# Patient Record
Sex: MALE | Race: WHITE | Employment: OTHER | URBAN - NONMETROPOLITAN AREA
[De-identification: names, ages, dates, MRNs, and addresses within clinical notes are randomized per-mention and may not be internally consistent; named-entity substitution may affect disease eponyms.]

---

## 2017-02-08 ENCOUNTER — OFFICE VISIT (OUTPATIENT)
Dept: PRIMARY CARE CLINIC | Age: 42
End: 2017-02-08
Payer: MEDICAID

## 2017-02-08 VITALS
OXYGEN SATURATION: 96 % | TEMPERATURE: 98 F | WEIGHT: 228 LBS | HEIGHT: 70 IN | HEART RATE: 88 BPM | SYSTOLIC BLOOD PRESSURE: 146 MMHG | BODY MASS INDEX: 32.64 KG/M2 | RESPIRATION RATE: 20 BRPM | DIASTOLIC BLOOD PRESSURE: 88 MMHG

## 2017-02-08 DIAGNOSIS — R73.9 HYPERGLYCEMIA: ICD-10-CM

## 2017-02-08 DIAGNOSIS — M54.16 LUMBAR RADICULOPATHY: ICD-10-CM

## 2017-02-08 DIAGNOSIS — I10 ESSENTIAL HYPERTENSION: ICD-10-CM

## 2017-02-08 DIAGNOSIS — M51.36 DISC DEGENERATION, LUMBAR: Primary | ICD-10-CM

## 2017-02-08 DIAGNOSIS — F90.2 ATTENTION DEFICIT HYPERACTIVITY DISORDER (ADHD), COMBINED TYPE: ICD-10-CM

## 2017-02-08 LAB
AMPHETAMINE SCREEN, URINE: NORMAL
BARBITURATE SCREEN, URINE: NORMAL
BENZODIAZEPINE SCREEN, URINE: NORMAL
COCAINE METABOLITE SCREEN URINE: NORMAL
HBA1C MFR BLD: 5.4 %
MDMA URINE: NORMAL
METHADONE SCREEN, URINE: NORMAL
METHAMPHETAMINE, URINE: NORMAL
OPIATE SCREEN URINE: NORMAL
OXYCODONE SCREEN URINE: NORMAL
PHENCYCLIDINE SCREEN URINE: NORMAL
PROPOXYPHENE SCREEN, URINE: NORMAL
THC: NORMAL
TRICYCLIC ANTIDEPRESSANTS, UR: NORMAL

## 2017-02-08 PROCEDURE — 80305 DRUG TEST PRSMV DIR OPT OBS: CPT | Performed by: FAMILY MEDICINE

## 2017-02-08 PROCEDURE — 99214 OFFICE O/P EST MOD 30 MIN: CPT | Performed by: FAMILY MEDICINE

## 2017-02-08 PROCEDURE — 83036 HEMOGLOBIN GLYCOSYLATED A1C: CPT | Performed by: FAMILY MEDICINE

## 2017-02-08 RX ORDER — OXYCODONE AND ACETAMINOPHEN 10; 325 MG/1; MG/1
1 TABLET ORAL EVERY 8 HOURS PRN
Qty: 90 TABLET | Refills: 0 | Status: SHIPPED | OUTPATIENT
Start: 2017-02-08 | End: 2017-03-27 | Stop reason: SDUPTHER

## 2017-02-08 RX ORDER — DEXTROAMPHETAMINE SACCHARATE, AMPHETAMINE ASPARTATE MONOHYDRATE, DEXTROAMPHETAMINE SULFATE AND AMPHETAMINE SULFATE 7.5; 7.5; 7.5; 7.5 MG/1; MG/1; MG/1; MG/1
30 CAPSULE, EXTENDED RELEASE ORAL EVERY MORNING
Qty: 30 CAPSULE | Refills: 0 | Status: SHIPPED | OUTPATIENT
Start: 2017-02-08 | End: 2017-02-17 | Stop reason: ALTCHOICE

## 2017-02-08 RX ORDER — HYDROCODONE BITARTRATE AND ACETAMINOPHEN 10; 325 MG/1; MG/1
1 TABLET ORAL EVERY 8 HOURS PRN
Qty: 90 TABLET | Refills: 0 | Status: CANCELLED | OUTPATIENT
Start: 2017-02-08

## 2017-02-08 ASSESSMENT — ENCOUNTER SYMPTOMS
COUGH: 0
BACK PAIN: 1
NAUSEA: 0
TROUBLE SWALLOWING: 0
VOMITING: 0
CHEST TIGHTNESS: 0
RHINORRHEA: 0
SORE THROAT: 0
EYE PAIN: 0
COLOR CHANGE: 0
SHORTNESS OF BREATH: 0
ABDOMINAL PAIN: 0
WHEEZING: 0
CONSTIPATION: 0
DIARRHEA: 0
PHOTOPHOBIA: 0

## 2017-02-14 ENCOUNTER — TELEPHONE (OUTPATIENT)
Dept: PRIMARY CARE CLINIC | Age: 42
End: 2017-02-14

## 2017-02-16 RX ORDER — DEXTROAMPHETAMINE SACCHARATE, AMPHETAMINE ASPARTATE, DEXTROAMPHETAMINE SULFATE AND AMPHETAMINE SULFATE 7.5; 7.5; 7.5; 7.5 MG/1; MG/1; MG/1; MG/1
30 TABLET ORAL 2 TIMES DAILY
Qty: 60 TABLET | Refills: 0 | Status: CANCELLED | OUTPATIENT
Start: 2017-02-16

## 2017-02-17 RX ORDER — DEXTROAMPHETAMINE SACCHARATE, AMPHETAMINE ASPARTATE, DEXTROAMPHETAMINE SULFATE AND AMPHETAMINE SULFATE 5; 5; 5; 5 MG/1; MG/1; MG/1; MG/1
20 TABLET ORAL 2 TIMES DAILY
Qty: 60 TABLET | Refills: 0 | Status: SHIPPED | OUTPATIENT
Start: 2017-02-17 | End: 2017-03-23 | Stop reason: SDUPTHER

## 2017-03-23 DIAGNOSIS — M51.36 DISC DEGENERATION, LUMBAR: ICD-10-CM

## 2017-03-23 DIAGNOSIS — M54.16 LUMBAR RADICULOPATHY: ICD-10-CM

## 2017-03-23 RX ORDER — HYDROCODONE BITARTRATE AND ACETAMINOPHEN 10; 325 MG/1; MG/1
1 TABLET ORAL EVERY 8 HOURS PRN
Qty: 90 TABLET | Refills: 0 | Status: CANCELLED | OUTPATIENT
Start: 2017-03-23

## 2017-03-29 RX ORDER — OXYCODONE AND ACETAMINOPHEN 10; 325 MG/1; MG/1
TABLET ORAL
Qty: 90 TABLET | Refills: 0 | OUTPATIENT
Start: 2017-03-29

## 2017-03-29 RX ORDER — DEXTROAMPHETAMINE SACCHARATE, AMPHETAMINE ASPARTATE, DEXTROAMPHETAMINE SULFATE AND AMPHETAMINE SULFATE 5; 5; 5; 5 MG/1; MG/1; MG/1; MG/1
TABLET ORAL
Qty: 60 TABLET | Refills: 0 | OUTPATIENT
Start: 2017-03-29

## 2017-03-29 RX ORDER — OXYCODONE AND ACETAMINOPHEN 10; 325 MG/1; MG/1
1 TABLET ORAL EVERY 8 HOURS PRN
Qty: 90 TABLET | Refills: 0 | Status: SHIPPED | OUTPATIENT
Start: 2017-03-29 | End: 2017-04-28 | Stop reason: SDUPTHER

## 2017-03-29 RX ORDER — DEXTROAMPHETAMINE SACCHARATE, AMPHETAMINE ASPARTATE, DEXTROAMPHETAMINE SULFATE AND AMPHETAMINE SULFATE 5; 5; 5; 5 MG/1; MG/1; MG/1; MG/1
20 TABLET ORAL 2 TIMES DAILY
Qty: 60 TABLET | Refills: 0 | Status: SHIPPED | OUTPATIENT
Start: 2017-03-29 | End: 2017-04-28 | Stop reason: SDUPTHER

## 2017-05-01 RX ORDER — OXYCODONE AND ACETAMINOPHEN 10; 325 MG/1; MG/1
1 TABLET ORAL EVERY 8 HOURS PRN
Qty: 90 TABLET | Refills: 0 | Status: SHIPPED | OUTPATIENT
Start: 2017-05-01 | End: 2017-05-02 | Stop reason: SDUPTHER

## 2017-05-01 RX ORDER — DEXTROAMPHETAMINE SACCHARATE, AMPHETAMINE ASPARTATE, DEXTROAMPHETAMINE SULFATE AND AMPHETAMINE SULFATE 5; 5; 5; 5 MG/1; MG/1; MG/1; MG/1
20 TABLET ORAL 2 TIMES DAILY
Qty: 60 TABLET | Refills: 0 | Status: SHIPPED | OUTPATIENT
Start: 2017-05-01 | End: 2017-05-02 | Stop reason: SDUPTHER

## 2017-05-02 RX ORDER — DEXTROAMPHETAMINE SACCHARATE, AMPHETAMINE ASPARTATE, DEXTROAMPHETAMINE SULFATE AND AMPHETAMINE SULFATE 5; 5; 5; 5 MG/1; MG/1; MG/1; MG/1
20 TABLET ORAL 2 TIMES DAILY
Qty: 60 TABLET | Refills: 0 | Status: SHIPPED | OUTPATIENT
Start: 2017-05-02 | End: 2017-08-21 | Stop reason: SDUPTHER

## 2017-05-02 RX ORDER — OXYCODONE AND ACETAMINOPHEN 10; 325 MG/1; MG/1
1 TABLET ORAL EVERY 8 HOURS PRN
Qty: 90 TABLET | Refills: 0 | Status: SHIPPED | OUTPATIENT
Start: 2017-05-02 | End: 2017-06-01

## 2017-08-21 ENCOUNTER — OFFICE VISIT (OUTPATIENT)
Dept: PRIMARY CARE CLINIC | Age: 42
End: 2017-08-21
Payer: MEDICAID

## 2017-08-21 VITALS
TEMPERATURE: 97.8 F | WEIGHT: 233 LBS | HEIGHT: 70 IN | BODY MASS INDEX: 33.36 KG/M2 | HEART RATE: 96 BPM | OXYGEN SATURATION: 98 % | SYSTOLIC BLOOD PRESSURE: 138 MMHG | RESPIRATION RATE: 18 BRPM | DIASTOLIC BLOOD PRESSURE: 78 MMHG

## 2017-08-21 DIAGNOSIS — F90.2 ATTENTION DEFICIT HYPERACTIVITY DISORDER (ADHD), COMBINED TYPE: Primary | ICD-10-CM

## 2017-08-21 DIAGNOSIS — E66.09 NON MORBID OBESITY DUE TO EXCESS CALORIES: ICD-10-CM

## 2017-08-21 DIAGNOSIS — Z98.1 S/P LUMBAR FUSION: ICD-10-CM

## 2017-08-21 DIAGNOSIS — M51.36 DDD (DEGENERATIVE DISC DISEASE), LUMBAR: ICD-10-CM

## 2017-08-21 PROCEDURE — 99214 OFFICE O/P EST MOD 30 MIN: CPT | Performed by: FAMILY MEDICINE

## 2017-08-21 RX ORDER — OXYCODONE AND ACETAMINOPHEN 10; 325 MG/1; MG/1
1 TABLET ORAL 3 TIMES DAILY PRN
COMMUNITY
End: 2017-08-21 | Stop reason: SDUPTHER

## 2017-08-21 RX ORDER — DEXTROAMPHETAMINE SACCHARATE, AMPHETAMINE ASPARTATE, DEXTROAMPHETAMINE SULFATE AND AMPHETAMINE SULFATE 5; 5; 5; 5 MG/1; MG/1; MG/1; MG/1
20 TABLET ORAL 2 TIMES DAILY
Qty: 60 TABLET | Refills: 0 | Status: SHIPPED | OUTPATIENT
Start: 2017-08-21 | End: 2017-09-29 | Stop reason: SDUPTHER

## 2017-08-21 RX ORDER — OXYCODONE AND ACETAMINOPHEN 10; 325 MG/1; MG/1
1 TABLET ORAL 3 TIMES DAILY PRN
Qty: 90 TABLET | Refills: 0 | Status: SHIPPED | OUTPATIENT
Start: 2017-08-21 | End: 2017-09-29 | Stop reason: SDUPTHER

## 2017-08-21 ASSESSMENT — ENCOUNTER SYMPTOMS
PHOTOPHOBIA: 0
CONSTIPATION: 0
WHEEZING: 0
NAUSEA: 0
SHORTNESS OF BREATH: 0
SORE THROAT: 0
COUGH: 0
CHEST TIGHTNESS: 0
TROUBLE SWALLOWING: 0
EYE PAIN: 0
BACK PAIN: 1
RHINORRHEA: 0
ABDOMINAL PAIN: 0
DIARRHEA: 0
VOMITING: 0
COLOR CHANGE: 0

## 2017-09-29 RX ORDER — OXYCODONE AND ACETAMINOPHEN 10; 325 MG/1; MG/1
1 TABLET ORAL 3 TIMES DAILY PRN
Qty: 90 TABLET | Refills: 0 | Status: SHIPPED | OUTPATIENT
Start: 2017-09-29 | End: 2017-11-06 | Stop reason: SDUPTHER

## 2017-09-29 RX ORDER — DEXTROAMPHETAMINE SACCHARATE, AMPHETAMINE ASPARTATE, DEXTROAMPHETAMINE SULFATE AND AMPHETAMINE SULFATE 5; 5; 5; 5 MG/1; MG/1; MG/1; MG/1
20 TABLET ORAL 2 TIMES DAILY
Qty: 60 TABLET | Refills: 0 | Status: SHIPPED | OUTPATIENT
Start: 2017-09-29 | End: 2017-11-06 | Stop reason: SDUPTHER

## 2017-11-06 RX ORDER — DEXTROAMPHETAMINE SACCHARATE, AMPHETAMINE ASPARTATE, DEXTROAMPHETAMINE SULFATE AND AMPHETAMINE SULFATE 5; 5; 5; 5 MG/1; MG/1; MG/1; MG/1
20 TABLET ORAL 2 TIMES DAILY
Qty: 60 TABLET | Refills: 0 | Status: SHIPPED | OUTPATIENT
Start: 2017-11-06 | End: 2017-12-12 | Stop reason: SDUPTHER

## 2017-11-06 RX ORDER — OXYCODONE AND ACETAMINOPHEN 10; 325 MG/1; MG/1
1 TABLET ORAL 3 TIMES DAILY PRN
Qty: 90 TABLET | Refills: 0 | Status: SHIPPED | OUTPATIENT
Start: 2017-11-06 | End: 2017-12-12 | Stop reason: SDUPTHER

## 2017-11-18 ENCOUNTER — APPOINTMENT (OUTPATIENT)
Dept: CT IMAGING | Age: 42
End: 2017-11-18
Payer: MEDICAID

## 2017-11-18 ENCOUNTER — HOSPITAL ENCOUNTER (EMERGENCY)
Age: 42
Discharge: HOME | End: 2017-11-18
Attending: EMERGENCY MEDICINE
Payer: MEDICAID

## 2017-11-18 VITALS
RESPIRATION RATE: 18 BRPM | HEIGHT: 70 IN | TEMPERATURE: 98 F | WEIGHT: 230 LBS | HEART RATE: 80 BPM | DIASTOLIC BLOOD PRESSURE: 67 MMHG | BODY MASS INDEX: 32.93 KG/M2 | OXYGEN SATURATION: 98 % | SYSTOLIC BLOOD PRESSURE: 105 MMHG

## 2017-11-18 DIAGNOSIS — S39.012A STRAIN OF LUMBAR REGION, INITIAL ENCOUNTER: Primary | ICD-10-CM

## 2017-11-18 PROCEDURE — 99283 EMERGENCY DEPT VISIT LOW MDM: CPT

## 2017-11-18 PROCEDURE — 6360000002 HC RX W HCPCS: Performed by: EMERGENCY MEDICINE

## 2017-11-18 PROCEDURE — 72131 CT LUMBAR SPINE W/O DYE: CPT

## 2017-11-18 PROCEDURE — 99282 EMERGENCY DEPT VISIT SF MDM: CPT | Performed by: EMERGENCY MEDICINE

## 2017-11-18 PROCEDURE — 96372 THER/PROPH/DIAG INJ SC/IM: CPT

## 2017-11-18 RX ORDER — IBUPROFEN 800 MG/1
800 TABLET ORAL EVERY 6 HOURS PRN
Qty: 20 TABLET | Refills: 0 | Status: SHIPPED | OUTPATIENT
Start: 2017-11-18 | End: 2019-02-03 | Stop reason: ALTCHOICE

## 2017-11-18 RX ORDER — KETOROLAC TROMETHAMINE 30 MG/ML
60 INJECTION, SOLUTION INTRAMUSCULAR; INTRAVENOUS ONCE
Status: COMPLETED | OUTPATIENT
Start: 2017-11-18 | End: 2017-11-18

## 2017-11-18 RX ORDER — TRIAMCINOLONE ACETONIDE 40 MG/ML
40 INJECTION, SUSPENSION INTRA-ARTICULAR; INTRAMUSCULAR ONCE
Status: COMPLETED | OUTPATIENT
Start: 2017-11-18 | End: 2017-11-18

## 2017-11-18 RX ADMIN — KETOROLAC TROMETHAMINE 60 MG: 30 INJECTION, SOLUTION INTRAMUSCULAR at 15:47

## 2017-11-18 RX ADMIN — TRIAMCINOLONE ACETONIDE 40 MG: 40 INJECTION, SUSPENSION INTRA-ARTICULAR; INTRAMUSCULAR at 15:47

## 2017-11-18 ASSESSMENT — PAIN SCALES - GENERAL
PAINLEVEL_OUTOF10: 10

## 2017-11-18 ASSESSMENT — ENCOUNTER SYMPTOMS: BACK PAIN: 1

## 2017-11-18 NOTE — ED NOTES
Patient is resting with eyes closed and appears to not be in any distress. Lights are off in room. Family at bedside.      Angelita Catherine RN  11/18/17 7628

## 2017-11-18 NOTE — ED NOTES
While attempting to give discharge instructions to patient, patient fell asleep. Woke patient up twice and attempted to continue to give discharge instructions.       Trevor Renteria RN  11/18/17 5393

## 2017-11-18 NOTE — ED PROVIDER NOTES
[facility] EMERGENCY DEPT  eMERGENCY dEPARTMENT eNCOUnter      Pt Name: Adeola Lara  MRN: [id number]  [place] 1975  Date of evaluation: 11/18/2017  Provider: Otoniel Bravo MD    [address] [place] [address]       Chief Complaint   Patient presents with    Back Pain     Patient lifting box and twisted          HISTORY OF PRESENT ILLNESS   (Location/Symptom, Timing/Onset, Context/Setting, Quality, Duration, Modifying Factors, Severity)  Note limiting factors. Adeola Lara is a 43 y.o. male who presents to the emergency department        Back Pain   Location:  Lumbar spine  Quality:  Aching  Radiates to:  Does not radiate  Pain severity:  Severe  Pain is:  Unable to specify  Onset quality:  Sudden  Duration:  1 day  Timing:  Constant  Progression:  Unchanged  Chronicity:  Recurrent  Context comment:  \"twisted\", \"immediate excruciating pain\"  Relieved by:  None tried  Worsened by:  Twisting, touching and ambulation  Ineffective treatments:  Narcotics  Associated symptoms: no leg pain, no numbness, no paresthesias, no perianal numbness, no tingling and no weakness    Risk factors comment:  Lumbar surgeries x 2      Nursing Notes were reviewed. REVIEW OF SYSTEMS    (2-9 systems for level 4, 10 or more for level 5)     Review of Systems   Musculoskeletal: Positive for back pain. Neurological: Negative for tingling, weakness, numbness and paresthesias. All other systems reviewed and are negative. Except as noted above the remainder of the review of systems was reviewed and negative.        PAST MEDICAL HISTORY     Past Medical History:   Diagnosis Date    ADHD (attention deficit hyperactivity disorder)     Anxiety     Chronic back pain     Colon polyp     Disc degeneration, lumbar 5/8/2012    Herniated disc     L5-Dr Ted Berry    Hyperlipidemia     Obesity, unspecified 5/8/2012         SURGICAL HISTORY       Past Surgical History:   Procedure Laterality Date    AV FISTULA REPAIR  2003    COLON SURGERY  2003    polyp [place] [place] SPINE SURGERY  2002    L5-S1-Dr Peoples-lumbar laminectomy   [facility] SPINE SURGERY  09-    Dr Woodruff Masters Jese Campos with fusion, [facility]         CURRENT MEDICATIONS       Discharge Medication List as of 11/18/2017  5:10 PM      CONTINUE these medications which have NOT CHANGED    Details   amphetamine-dextroamphetamine (ADDERALL, 20MG,) 20 MG tablet Take 1 tablet by mouth 2 times daily . , Disp-60 tablet, R-0Normal      oxyCODONE-acetaminophen (PERCOCET)  MG per tablet Take 1 tablet by mouth 3 times daily as needed for Pain ., Disp-90 tablet, R-0Normal             ALLERGIES     Neurontin [gabapentin]    FAMILY HISTORY       Family History   Problem Relation Age of Onset    Prostate Cancer Paternal Uncle     Lung Cancer Paternal Grandfather     Brain Cancer Paternal Grandfather     Prostate Cancer Paternal Grandfather           SOCIAL HISTORY       Social History     Social History    Marital status:      Spouse name: N/A    Number of children: N/A    Years of education: N/A     Social History Main Topics    Smoking status: Current Every Day Smoker     Packs/day: 0.50     Years: 12.00    Smokeless tobacco: Never Used    Alcohol use No    Drug use: No    Sexual activity: Not Asked     Other Topics Concern    None     Social History Narrative    None       SCREENINGS    Anderson Coma Scale  Eye Opening: Spontaneous  Best Verbal Response: Oriented  Best Motor Response: Obeys commands  W[place] Coma Scale Score: 15        PHYSICAL EXAM    (up to 7 for level 4, 8 or more for level 5)     ED Triage Vitals [11/18/17 1424]   BP Temp Temp Source Pulse Resp SpO2 Height Weight   (!) 142/87 98 °F (36.7 °C) Oral 77 18 96 % 5' 10\" (1.778 m) 230 lb (104.3 kg)       Physical Exam   Constitutional: He is oriented to person, place, and time. He appears well-developed and well-nourished. No distress.    Sleeping when I come in   Cardiovascular:

## 2017-11-18 NOTE — ED NOTES
Patient is crying in pain and is laying on stomach to attempt to relieve pressure on back. Waiting for ED provider to sign up for patient.      Delia Mcmahon RN  11/18/17 7454

## 2017-12-12 RX ORDER — DEXTROAMPHETAMINE SACCHARATE, AMPHETAMINE ASPARTATE, DEXTROAMPHETAMINE SULFATE AND AMPHETAMINE SULFATE 5; 5; 5; 5 MG/1; MG/1; MG/1; MG/1
20 TABLET ORAL 2 TIMES DAILY
Qty: 60 TABLET | Refills: 0 | Status: SHIPPED | OUTPATIENT
Start: 2017-12-12 | End: 2018-01-12 | Stop reason: SDUPTHER

## 2017-12-12 RX ORDER — OXYCODONE AND ACETAMINOPHEN 10; 325 MG/1; MG/1
1 TABLET ORAL 3 TIMES DAILY PRN
Qty: 90 TABLET | Refills: 0 | Status: SHIPPED | OUTPATIENT
Start: 2017-12-12 | End: 2018-01-12 | Stop reason: SDUPTHER

## 2017-12-12 NOTE — TELEPHONE ENCOUNTER
NYDIA was reviewed today per office protocol. Report shows No discrepancies. Fill pattern is consistent from single provider(s) at single pharmacy(s).     Presciption Escribed

## 2018-01-15 RX ORDER — DEXTROAMPHETAMINE SACCHARATE, AMPHETAMINE ASPARTATE, DEXTROAMPHETAMINE SULFATE AND AMPHETAMINE SULFATE 5; 5; 5; 5 MG/1; MG/1; MG/1; MG/1
20 TABLET ORAL 2 TIMES DAILY
Qty: 60 TABLET | Refills: 0 | Status: SHIPPED | OUTPATIENT
Start: 2018-01-15 | End: 2019-02-03 | Stop reason: ALTCHOICE

## 2018-01-15 RX ORDER — OXYCODONE AND ACETAMINOPHEN 10; 325 MG/1; MG/1
1 TABLET ORAL 3 TIMES DAILY PRN
Qty: 90 TABLET | Refills: 0 | Status: SHIPPED | OUTPATIENT
Start: 2018-01-15 | End: 2018-02-14

## 2019-02-03 ENCOUNTER — HOSPITAL ENCOUNTER (EMERGENCY)
Age: 44
Discharge: HOME | End: 2019-02-03
Attending: EMERGENCY MEDICINE
Payer: MEDICAID

## 2019-02-03 VITALS
HEART RATE: 65 BPM | TEMPERATURE: 98.2 F | SYSTOLIC BLOOD PRESSURE: 127 MMHG | WEIGHT: 220 LBS | OXYGEN SATURATION: 99 % | RESPIRATION RATE: 16 BRPM | HEIGHT: 70 IN | BODY MASS INDEX: 31.5 KG/M2 | DIASTOLIC BLOOD PRESSURE: 70 MMHG

## 2019-02-03 DIAGNOSIS — N23 RENAL COLIC: ICD-10-CM

## 2019-02-03 DIAGNOSIS — R10.9 LEFT FLANK PAIN: Primary | ICD-10-CM

## 2019-02-03 LAB
BACTERIA: NEGATIVE /HPF
BILIRUBIN URINE: NEGATIVE
BLOOD, URINE: ABNORMAL
CLARITY: CLEAR
COLOR: YELLOW
EPITHELIAL CELLS, UA: 0 /HPF (ref 0–5)
GLUCOSE URINE: NEGATIVE MG/DL
HYALINE CASTS: 2 /HPF (ref 0–8)
KETONES, URINE: NEGATIVE MG/DL
LEUKOCYTE ESTERASE, URINE: NEGATIVE
NITRITE, URINE: NEGATIVE
PH UA: 7
PROTEIN UA: NEGATIVE MG/DL
RBC UA: 24 /HPF (ref 0–4)
SPECIFIC GRAVITY UA: 1.01
UROBILINOGEN, URINE: 1 E.U./DL
WBC UA: 2 /HPF (ref 0–5)

## 2019-02-03 PROCEDURE — 81001 URINALYSIS AUTO W/SCOPE: CPT

## 2019-02-03 PROCEDURE — 99283 EMERGENCY DEPT VISIT LOW MDM: CPT | Performed by: EMERGENCY MEDICINE

## 2019-02-03 PROCEDURE — 99283 EMERGENCY DEPT VISIT LOW MDM: CPT

## 2019-02-03 ASSESSMENT — ENCOUNTER SYMPTOMS
EYE PAIN: 0
TROUBLE SWALLOWING: 0
BACK PAIN: 0
NAUSEA: 1
ABDOMINAL PAIN: 1
SINUS PRESSURE: 0
CHEST TIGHTNESS: 0
COLOR CHANGE: 0
CONSTIPATION: 0
WHEEZING: 0
PHOTOPHOBIA: 0
SHORTNESS OF BREATH: 0
DIARRHEA: 0
VOMITING: 1

## 2020-05-21 ENCOUNTER — HOSPITAL ENCOUNTER (EMERGENCY)
Facility: HOSPITAL | Age: 45
Discharge: HOME | End: 2020-05-21
Attending: EMERGENCY MEDICINE | Admitting: EMERGENCY MEDICINE

## 2020-05-21 VITALS
SYSTOLIC BLOOD PRESSURE: 129 MMHG | DIASTOLIC BLOOD PRESSURE: 86 MMHG | OXYGEN SATURATION: 97 % | BODY MASS INDEX: 32.93 KG/M2 | WEIGHT: 230 LBS | TEMPERATURE: 97.3 F | HEART RATE: 82 BPM | RESPIRATION RATE: 16 BRPM | HEIGHT: 70 IN

## 2020-05-21 DIAGNOSIS — M54.32 SCIATICA OF LEFT SIDE: Primary | ICD-10-CM

## 2020-05-21 PROCEDURE — 99283 EMERGENCY DEPT VISIT LOW MDM: CPT

## 2020-05-21 PROCEDURE — 63710000001 PREDNISONE PER 1 MG: Performed by: EMERGENCY MEDICINE

## 2020-05-21 RX ORDER — OXYCODONE AND ACETAMINOPHEN 7.5; 325 MG/1; MG/1
1 TABLET ORAL EVERY 4 HOURS PRN
Qty: 15 TABLET | Refills: 0 | Status: SHIPPED | OUTPATIENT
Start: 2020-05-21 | End: 2023-03-08

## 2020-05-21 RX ORDER — PREDNISONE 20 MG/1
20 TABLET ORAL 2 TIMES DAILY
Qty: 14 TABLET | Refills: 0 | Status: SHIPPED | OUTPATIENT
Start: 2020-05-21 | End: 2023-03-08

## 2020-05-21 RX ORDER — OXYCODONE AND ACETAMINOPHEN 7.5; 325 MG/1; MG/1
1 TABLET ORAL ONCE
Status: COMPLETED | OUTPATIENT
Start: 2020-05-21 | End: 2020-05-21

## 2020-05-21 RX ORDER — PREDNISONE 20 MG/1
60 TABLET ORAL ONCE
Status: COMPLETED | OUTPATIENT
Start: 2020-05-21 | End: 2020-05-21

## 2020-05-21 RX ORDER — PREDNISONE 20 MG/1
20 TABLET ORAL 2 TIMES DAILY
Qty: 14 TABLET | Refills: 0 | Status: SHIPPED | OUTPATIENT
Start: 2020-05-21 | End: 2020-05-21 | Stop reason: SDUPTHER

## 2020-05-21 RX ADMIN — PREDNISONE 60 MG: 20 TABLET ORAL at 22:07

## 2020-05-21 RX ADMIN — OXYCODONE HYDROCHLORIDE AND ACETAMINOPHEN 1 TABLET: 7.5; 325 TABLET ORAL at 22:07

## 2020-05-22 NOTE — ED PROVIDER NOTES
Subjective   Patient presents with a complaint of severe back pain that he says started about a week ago.  He does not member any specific injury but does say he had to pull up some hardwood shante and thought maybe he initially hurt his back despite using in a way that he normally does not use it.  However the pain is progressed and worsened now.  It is going down his left leg and is become unbearable.  He said he was laying in the floor crying last night.  He has had back surgery x2 in the past but that was many years ago when he felt like he has had a successful surgery because he is been able do what ever he wanted to do.  However he did go through some pain management a couple years ago and felt like he was addicted to pain medicine at that time.  He is on nothing at the present time but this pain is become unbearable.  He denies any bowel or bladder difficulty.  He has pain actually starts in the left side of the back but not over the midline.  It starts just above the gluteal area.      History provided by:  Patient   used: No    Back Pain   Location:  Lumbar spine  Quality:  Aching and burning  Radiates to:  L posterior upper leg  Pain severity:  Severe  Pain is:  Same all the time  Onset quality:  Gradual  Duration:  1 week  Timing:  Constant  Progression:  Worsening  Chronicity:  New  Context: not emotional stress, not falling, not jumping from heights, not lifting heavy objects, not MCA, not MVA, not occupational injury, not pedestrian accident, not physical stress, not recent illness, not recent injury and not twisting    Relieved by:  Nothing  Worsened by:  Nothing  Ineffective treatments:  None tried  Associated symptoms: no abdominal pain, no abdominal swelling, no bladder incontinence, no bowel incontinence, no chest pain, no dysuria, no fever, no headaches, no leg pain, no numbness, no paresthesias, no pelvic pain, no perianal numbness, no tingling, no weakness and no weight  "loss        Review of Systems   Constitutional: Negative.  Negative for fever and weight loss.   HENT: Negative.    Respiratory: Negative.    Cardiovascular: Negative.  Negative for chest pain.   Gastrointestinal: Negative.  Negative for abdominal pain and bowel incontinence.   Genitourinary: Negative.  Negative for bladder incontinence, dysuria and pelvic pain.   Musculoskeletal: Positive for back pain.   Skin: Negative.    Neurological: Negative.  Negative for tingling, weakness, numbness, headaches and paresthesias.   Psychiatric/Behavioral: Negative.    All other systems reviewed and are negative.      History reviewed. No pertinent past medical history.    Allergies   Allergen Reactions   • Gabapentin Mental Status Change     \"like a date rape drug for me. Felt drunk\" for 2 days       Past Surgical History:   Procedure Laterality Date   • BACK SURGERY     • LUMBAR LAMINECTOMY      L5-S1   • LUMBAR LAMINECTOMY ANTERIOR LUMBAR INTERBODY FUSION      L5-S1   • POLYPECTOMY     • RECTAL FISTULOTOMY         History reviewed. No pertinent family history.    Social History     Socioeconomic History   • Marital status:      Spouse name: Not on file   • Number of children: Not on file   • Years of education: Not on file   • Highest education level: Not on file   Tobacco Use   • Smoking status: Current Every Day Smoker     Packs/day: 0.50     Types: Cigarettes   • Smokeless tobacco: Never Used   Substance and Sexual Activity   • Alcohol use: Not Currently       Prior to Admission medications    Medication Sig Start Date End Date Taking? Authorizing Provider   oxyCODONE-acetaminophen (PERCOCET) 7.5-325 MG per tablet Take 1 tablet by mouth Every 4 (Four) Hours As Needed for Moderate Pain . 5/21/20   Efe Horner Jr., MD   predniSONE (DELTASONE) 20 MG tablet Take 1 tablet by mouth 2 (Two) Times a Day. 5/21/20   Efe Horner Jr., MD   predniSONE (DELTASONE) 20 MG tablet Take 1 tablet by mouth 2 (Two) Times a " Day. 5/21/20 5/21/20  Efe Horner Jr., MD       Medications   oxyCODONE-acetaminophen (PERCOCET) 7.5-325 MG per tablet 1 tablet (1 tablet Oral Given 5/21/20 2207)   predniSONE (DELTASONE) tablet 60 mg (60 mg Oral Given 5/21/20 2207)       Vitals:    05/21/20 2112   BP: 145/89   Pulse: 96   Resp: 17   Temp: 97.9 °F (36.6 °C)   SpO2: 99%         Objective   Physical Exam   Constitutional: He is oriented to person, place, and time. He appears well-developed and well-nourished.   Abdominal: Soft. Bowel sounds are normal.   Musculoskeletal: Normal range of motion.   Patient continuously on his left side and keeps his legs drawn up because of the pain in his back.  Deep tendon reflexes are 2+ and equal.  He has normal sensation in both legs.  Movement of the left leg does cause pain in his back.  There is no bony deformity palpated.   Neurological: He is alert and oriented to person, place, and time.   Skin: Skin is warm and dry.   Psychiatric: He has a normal mood and affect. His behavior is normal.   Nursing note and vitals reviewed.      Procedures         Lab Results (last 24 hours)     ** No results found for the last 24 hours. **          No orders to display       ED Course  ED Course as of May 21 2221   Thu May 21, 2020   2220 I told the patient he is describing radicular pain probably most consistent with sciatica.  Disc pain would be possible problem obviously but right now it sounds more like sciatica.  Offered to do x-ray of his back but he did not want to do that because we both it would not be really helpful in his particular case.  We will treat him for sciatica so we get it better.  He needs to follow-up with his regular physician if it continues to be a problem.  He is discharged in stable condition.    [TR]   2220 [facility] #[id number] showed no problems.    [TR]      ED Course User Index  [TR] Efe Horner Jr., MD          MDM  Number of Diagnoses or Management Options  Sciatica of left side: new  and does not require workup  Risk of Complications, Morbidity, and/or Mortality  Presenting problems: moderate  Diagnostic procedures: low  Management options: moderate    Patient Progress  Patient progress: stable      Final diagnoses:   Sciatica of left side          Efe Horner Jr., MD  05/21/20 0806

## 2023-03-08 ENCOUNTER — OFFICE VISIT (OUTPATIENT)
Dept: FAMILY MEDICINE CLINIC | Facility: CLINIC | Age: 48
End: 2023-03-08
Payer: COMMERCIAL

## 2023-03-08 VITALS
SYSTOLIC BLOOD PRESSURE: 125 MMHG | BODY MASS INDEX: 33.21 KG/M2 | DIASTOLIC BLOOD PRESSURE: 84 MMHG | HEIGHT: 70 IN | OXYGEN SATURATION: 98 % | WEIGHT: 232 LBS | HEART RATE: 80 BPM | TEMPERATURE: 98.6 F

## 2023-03-08 DIAGNOSIS — J06.9 ACUTE URI: Primary | ICD-10-CM

## 2023-03-08 DIAGNOSIS — R51.9 ACUTE NONINTRACTABLE HEADACHE, UNSPECIFIED HEADACHE TYPE: ICD-10-CM

## 2023-03-08 DIAGNOSIS — J02.9 SORE THROAT: ICD-10-CM

## 2023-03-08 DIAGNOSIS — R09.81 NASAL CONGESTION: ICD-10-CM

## 2023-03-08 PROCEDURE — 99203 OFFICE O/P NEW LOW 30 MIN: CPT

## 2023-03-08 PROCEDURE — 1159F MED LIST DOCD IN RCRD: CPT

## 2023-03-08 PROCEDURE — 1160F RVW MEDS BY RX/DR IN RCRD: CPT

## 2023-03-08 RX ORDER — AZITHROMYCIN 250 MG/1
TABLET, FILM COATED ORAL
Qty: 6 TABLET | Refills: 0 | Status: SHIPPED | OUTPATIENT
Start: 2023-03-08

## 2023-03-08 RX ORDER — METHYLPREDNISOLONE 4 MG/1
TABLET ORAL
Qty: 21 TABLET | Refills: 0 | Status: SHIPPED | OUTPATIENT
Start: 2023-03-08

## 2023-03-08 NOTE — PROGRESS NOTES
"Chief Complaint  Nasal Congestion, Sore Throat, and Headache    Subjective    History of Present Illness      Patient presents to [facility] for   History of Present Illness  Pt is here today with c/o congestion, sore throat and headache that began this morning, 3/8/23. Pt reports both his wife and daughter have been sick for several days with similar symptoms.       Review of Systems   HENT: Positive for congestion and sore throat.    All other systems reviewed and are negative.      I have reviewed and agree with the HPI and ROS information as above.  Nenita Loyd, BOUCHRA     Objective   Vital Signs:   /84   Pulse 80   Temp 98.6 °F (37 °C)   Ht 177.8 cm (70\")   Wt 105 kg (232 lb)   SpO2 98%   BMI 33.29 kg/m²           Physical Exam  Constitutional:       Appearance: Normal appearance. He is well-developed.   HENT:      Head: Normocephalic and atraumatic.      Right Ear: Tympanic membrane, ear canal and external ear normal.      Left Ear: Tympanic membrane, ear canal and external ear normal.      Nose: Congestion present. No septal deviation or nasal tenderness.      Mouth/Throat:      Lips: Pink. No lesions.      Mouth: Mucous membranes are moist. No oral lesions.      Dentition: Normal dentition.      Pharynx: Oropharynx is clear. Posterior oropharyngeal erythema present. No pharyngeal swelling or oropharyngeal exudate.   Eyes:      General: Lids are normal. Vision grossly intact. No scleral icterus.        Right eye: No discharge.         Left eye: No discharge.      Extraocular Movements: Extraocular movements intact.      Conjunctiva/sclera: Conjunctivae normal.      Right eye: Right conjunctiva is not injected.      Left eye: Left conjunctiva is not injected.      Pupils: Pupils are equal, round, and reactive to light.   Neck:      Thyroid: No thyroid mass.      Trachea: Trachea normal.   Cardiovascular:      Rate and Rhythm: Normal rate and regular rhythm.      " Heart sounds: Normal heart sounds. No murmur heard.    No gallop.   Pulmonary:      Effort: Pulmonary effort is normal.      Breath sounds: Normal breath sounds and air entry. No wheezing, rhonchi or rales.   Abdominal:      General: There is no distension.      Palpations: Abdomen is soft. There is no mass.      Tenderness: There is no abdominal tenderness. There is no right CVA tenderness, left CVA tenderness, guarding or rebound.   Musculoskeletal:         General: No tenderness or deformity. Normal range of motion.      Cervical back: Full passive range of motion without pain, normal range of motion and neck supple.      Thoracic back: Normal.      Right lower leg: No edema.      Left lower leg: No edema.   Skin:     General: Skin is warm and dry.      Coloration: Skin is not jaundiced.      Findings: No rash.   Neurological:      Mental Status: He is alert and oriented to person, place, and time.      Sensory: Sensation is intact.      Motor: Motor function is intact.      Coordination: Coordination is intact.      Gait: Gait is intact.      Deep Tendon Reflexes: Reflexes are normal and symmetric.   Psychiatric:         Mood and Affect: Mood and affect normal.         Judgment: Judgment normal.          RICKY-7:      PHQ-2 Depression Screening  Little interest or pleasure in doing things? 0-->not at all   Feeling down, depressed, or hopeless? 0-->not at all   PHQ-2 Total Score 0     PHQ-9 Depression Screening  Little interest or pleasure in doing things? 0-->not at all   Feeling down, depressed, or hopeless? 0-->not at all   Trouble falling or staying asleep, or sleeping too much?     Feeling tired or having little energy?     Poor appetite or overeating?     Feeling bad about yourself - or that you are a failure or have let yourself or your family down?     Trouble concentrating on things, such as reading the newspaper or watching television?     Moving or speaking so slowly that other people could have noticed?  Or the opposite - being so fidgety or restless that you have been moving around a lot more than usual?     Thoughts that you would be better off dead, or of hurting yourself in some way?     PHQ-9 Total Score 0   If you checked off any problems, how difficult have these problems made it for you to do your work, take care of things at home, or get along with other people?        Result Review  Data Reviewed:                   Assessment and Plan      Diagnoses and all orders for this visit:    1. Acute URI (Primary)  -     methylPREDNISolone (MEDROL) 4 MG dose pack; Take as directed on package instructions.  Dispense: 21 tablet; Refill: 0  -     azithromycin (Zithromax Z-Jack) 250 MG tablet; Take 2 tablets by mouth on day 1, then 1 tablet daily on days 2-5  Dispense: 6 tablet; Refill: 0    2. Sore throat    3. Nasal congestion    4. Acute nonintractable headache, unspecified headache type    Patient is seen today with sore throat, nasal congestion and headache.  Patient states symptoms began last night.  Patient denies any fever or shortness of breath.  Patient does not wish to be tested for any viruses at this point.    Plan  1. Medrol Jack   2. Zpak sent to pharmacy         Follow Up   No follow-ups on file.  Patient was given instructions and counseling regarding his condition or for health maintenance advice. Please see specific information pulled into the AVS if appropriate.

## 2023-08-30 ENCOUNTER — NURSE TRIAGE (OUTPATIENT)
Dept: CALL CENTER | Facility: HOSPITAL | Age: 48
End: 2023-08-30
Payer: COMMERCIAL

## 2023-08-31 ENCOUNTER — HOSPITAL ENCOUNTER (EMERGENCY)
Age: 48
Discharge: HOME | End: 2023-08-31
Attending: EMERGENCY MEDICINE
Payer: MEDICAID

## 2023-08-31 VITALS
BODY MASS INDEX: 34.07 KG/M2 | DIASTOLIC BLOOD PRESSURE: 89 MMHG | HEIGHT: 70 IN | WEIGHT: 238 LBS | HEART RATE: 84 BPM | TEMPERATURE: 97.8 F | SYSTOLIC BLOOD PRESSURE: 144 MMHG | OXYGEN SATURATION: 100 % | RESPIRATION RATE: 18 BRPM

## 2023-08-31 DIAGNOSIS — S81.811A LACERATION OF RIGHT LOWER EXTREMITY, INITIAL ENCOUNTER: Primary | ICD-10-CM

## 2023-08-31 PROCEDURE — 99284 EMERGENCY DEPT VISIT MOD MDM: CPT

## 2023-08-31 PROCEDURE — 90471 IMMUNIZATION ADMIN: CPT | Performed by: EMERGENCY MEDICINE

## 2023-08-31 PROCEDURE — 90715 TDAP VACCINE 7 YRS/> IM: CPT | Performed by: EMERGENCY MEDICINE

## 2023-08-31 PROCEDURE — 6360000002 HC RX W HCPCS: Performed by: EMERGENCY MEDICINE

## 2023-08-31 RX ORDER — LIDOCAINE HYDROCHLORIDE 10 MG/ML
INJECTION, SOLUTION EPIDURAL; INFILTRATION; INTRACAUDAL; PERINEURAL
Status: DISCONTINUED
Start: 2023-08-31 | End: 2023-08-31 | Stop reason: HOSPADM

## 2023-08-31 RX ORDER — CEPHALEXIN 500 MG/1
500 CAPSULE ORAL 2 TIMES DAILY
Qty: 10 CAPSULE | Refills: 0 | Status: SHIPPED | OUTPATIENT
Start: 2023-08-31 | End: 2023-09-05

## 2023-08-31 RX ADMIN — TETANUS TOXOID, REDUCED DIPHTHERIA TOXOID AND ACELLULAR PERTUSSIS VACCINE, ADSORBED 0.5 ML: 5; 2.5; 8; 8; 2.5 SUSPENSION INTRAMUSCULAR at 04:21

## 2023-08-31 NOTE — ED NOTES
Wound to R leg cleaned with saline. Pt tolerated well. 3 steri strips placed to laceration to RLE, wound dressed with adaptic and rudolph.  Pt tolerated well     Analisa Alcocer RN  08/31/23 1173

## 2023-08-31 NOTE — TELEPHONE ENCOUNTER
Reason for Disposition   [1] Last tetanus shot > 5 years ago AND [2] DIRTY puncture (e.g., object OR skin was dirty, objects on ground/floor)    Additional Information   Negative: [1] Puncture wound of head, neck, chest, back, or abdomen AND [2] sounds life-threatening to the triager   Negative: Shock suspected (e.g., cold/pale/clammy skin, too weak to stand, low BP, rapid pulse)   Negative: Sounds like a life-threatening emergency to the triager   Negative: [1] Caused by a needlestick or other sharp object AND [2] possible exposure to another person's body fluids   Negative: Caused by an animal bite   Negative: Caused by a human bite   Negative: Caused by a marine animal sting or bite   Negative: Skin is cut or scraped, not punctured   Negative: Puncture wound of eye or eyelid   Negative: Foreign body is still in the skin (e.g., splinter, sliver, fishhook)   Negative: [1] Puncture wound of head, neck, chest, abdomen, or overlying a joint AND [2] could be deep   Negative: High pressure injection injury (e.g., from grease gun or paint gun, usually work-related)   Negative: Sounds like a serious injury to the triager   Negative: [1] SEVERE pain AND [2] not improved 2 hours after pain medicine   Negative: [1] Puncture wound of foot AND [2] hurts too much to walk on it (i.e., unable to bear weight, severe limp)   Negative: [1] Puncture wound of bare foot (no shoes) AND [2] setting was dirty  (Exception: Shallow puncture.)   Negative: [1] Puncture wound of foot AND [2] puncture went through shoe (e.g., tennis shoe)   Negative: [1] Puncture wound of finger AND [2] entire finger swollen   Negative: Puncture wound from a sharp object that was very dirty   Negative: [1] Dirt in the wound AND [2] not removed with 15 minutes of scrubbing   Negative: Sensation of something still in the wound (i.e., retained foreign body in wound)   Negative: Tip of the object is broken off and missing (e.g., pencil point)   Negative: [1] Red  "area or streak AND [2] fever   Negative: [1] Looks infected AND [2] large red area (> 1 in. or 2.5 cm)   Negative: [1] Looks infected (red area or pus) AND [2] no fever   Negative: [1] Pain or swelling AND [2] present > 5 days   Negative: No prior tetanus shots (or is not fully vaccinated)   Negative: [1] HIV positive or severe immunodeficiency (severely weak immune system) AND [2] DIRTY puncture (e.g., object OR skin was dirty, objects on ground/floor)    Answer Assessment - Initial Assessment Questions  1. LOCATION: \"Where is the puncture located?\"       Leg  2. OBJECT: \"What was the object that punctured the skin?\"       Old wire  3. DEPTH: \"How deep do you think the puncture goes?\"       1/4 inch  4. ONSET: \"When did the injury occur?\" (Minutes or hours)      Couple of hours prior to call  5. PAIN: \"Is it painful?\" If Yes, ask: \"How bad is the pain?\"  (Scale 1-10; or mild, moderate, severe)      yes  6. TETANUS: \"When was the last tetanus booster?\"      unsure  7. PREGNANCY: \"Is there any chance you are pregnant?\" \"When was your last menstrual period?\"      NA    Protocols used: Puncture Wound-ADULT-AH    "

## 2023-10-02 PROCEDURE — 87147 CULTURE TYPE IMMUNOLOGIC: CPT | Performed by: NURSE PRACTITIONER

## 2023-10-02 PROCEDURE — 87186 SC STD MICRODIL/AGAR DIL: CPT | Performed by: NURSE PRACTITIONER

## 2023-10-02 PROCEDURE — 87205 SMEAR GRAM STAIN: CPT | Performed by: NURSE PRACTITIONER

## 2023-10-02 PROCEDURE — 87070 CULTURE OTHR SPECIMN AEROBIC: CPT | Performed by: NURSE PRACTITIONER

## 2023-10-09 ENCOUNTER — HOSPITAL ENCOUNTER (EMERGENCY)
Facility: HOSPITAL | Age: 48
Discharge: HOME | End: 2023-10-09
Attending: STUDENT IN AN ORGANIZED HEALTH CARE EDUCATION/TRAINING PROGRAM | Admitting: STUDENT IN AN ORGANIZED HEALTH CARE EDUCATION/TRAINING PROGRAM
Payer: COMMERCIAL

## 2023-10-09 VITALS
WEIGHT: 238 LBS | HEIGHT: 70 IN | DIASTOLIC BLOOD PRESSURE: 86 MMHG | OXYGEN SATURATION: 99 % | RESPIRATION RATE: 15 BRPM | BODY MASS INDEX: 34.07 KG/M2 | TEMPERATURE: 97.2 F | SYSTOLIC BLOOD PRESSURE: 148 MMHG | HEART RATE: 86 BPM

## 2023-10-09 DIAGNOSIS — L23.7 POISON IVY DERMATITIS: Primary | ICD-10-CM

## 2023-10-09 DIAGNOSIS — Z87.2 HISTORY OF CELLULITIS: ICD-10-CM

## 2023-10-09 DIAGNOSIS — L03.115 CELLULITIS OF RIGHT LOWER EXTREMITY: ICD-10-CM

## 2023-10-09 LAB
ANION GAP SERPL CALCULATED.3IONS-SCNC: 11 MMOL/L (ref 5–15)
BASOPHILS # BLD AUTO: 0.02 10*3/MM3 (ref 0–0.2)
BASOPHILS NFR BLD AUTO: 0.3 % (ref 0–1.5)
BUN SERPL-MCNC: 10 MG/DL (ref 6–20)
BUN/CREAT SERPL: 9.3 (ref 7–25)
CALCIUM SPEC-SCNC: 8.6 MG/DL (ref 8.6–10.5)
CHLORIDE SERPL-SCNC: 103 MMOL/L (ref 98–107)
CO2 SERPL-SCNC: 25 MMOL/L (ref 22–29)
CREAT SERPL-MCNC: 1.08 MG/DL (ref 0.76–1.27)
DEPRECATED RDW RBC AUTO: 43.6 FL (ref 37–54)
EGFRCR SERPLBLD CKD-EPI 2021: 84.6 ML/MIN/1.73
EOSINOPHIL # BLD AUTO: 1.47 10*3/MM3 (ref 0–0.4)
EOSINOPHIL NFR BLD AUTO: 19.2 % (ref 0.3–6.2)
ERYTHROCYTE [DISTWIDTH] IN BLOOD BY AUTOMATED COUNT: 13.3 % (ref 12.3–15.4)
GLUCOSE SERPL-MCNC: 104 MG/DL (ref 65–99)
HCT VFR BLD AUTO: 44 % (ref 37.5–51)
HGB BLD-MCNC: 14.2 G/DL (ref 13–17.7)
IMM GRANULOCYTES # BLD AUTO: 0.02 10*3/MM3 (ref 0–0.05)
IMM GRANULOCYTES NFR BLD AUTO: 0.3 % (ref 0–0.5)
LYMPHOCYTES # BLD AUTO: 1.68 10*3/MM3 (ref 0.7–3.1)
LYMPHOCYTES NFR BLD AUTO: 22 % (ref 19.6–45.3)
MCH RBC QN AUTO: 28.7 PG (ref 26.6–33)
MCHC RBC AUTO-ENTMCNC: 32.3 G/DL (ref 31.5–35.7)
MCV RBC AUTO: 88.9 FL (ref 79–97)
MONOCYTES # BLD AUTO: 0.47 10*3/MM3 (ref 0.1–0.9)
MONOCYTES NFR BLD AUTO: 6.1 % (ref 5–12)
NEUTROPHILS NFR BLD AUTO: 3.99 10*3/MM3 (ref 1.7–7)
NEUTROPHILS NFR BLD AUTO: 52.1 % (ref 42.7–76)
NRBC BLD AUTO-RTO: 0 /100 WBC (ref 0–0.2)
NT-PROBNP SERPL-MCNC: <36 PG/ML (ref 0–450)
PLATELET # BLD AUTO: 357 10*3/MM3 (ref 140–450)
PMV BLD AUTO: 8.8 FL (ref 6–12)
POTASSIUM SERPL-SCNC: 4.5 MMOL/L (ref 3.5–5.2)
RBC # BLD AUTO: 4.95 10*6/MM3 (ref 4.14–5.8)
SODIUM SERPL-SCNC: 139 MMOL/L (ref 136–145)
WBC NRBC COR # BLD: 7.65 10*3/MM3 (ref 3.4–10.8)

## 2023-10-09 PROCEDURE — 96375 TX/PRO/DX INJ NEW DRUG ADDON: CPT

## 2023-10-09 PROCEDURE — 83880 ASSAY OF NATRIURETIC PEPTIDE: CPT | Performed by: STUDENT IN AN ORGANIZED HEALTH CARE EDUCATION/TRAINING PROGRAM

## 2023-10-09 PROCEDURE — 99283 EMERGENCY DEPT VISIT LOW MDM: CPT

## 2023-10-09 PROCEDURE — 96374 THER/PROPH/DIAG INJ IV PUSH: CPT

## 2023-10-09 PROCEDURE — 25010000002 METHYLPREDNISOLONE PER 125 MG: Performed by: STUDENT IN AN ORGANIZED HEALTH CARE EDUCATION/TRAINING PROGRAM

## 2023-10-09 PROCEDURE — 85025 COMPLETE CBC W/AUTO DIFF WBC: CPT | Performed by: STUDENT IN AN ORGANIZED HEALTH CARE EDUCATION/TRAINING PROGRAM

## 2023-10-09 PROCEDURE — 80048 BASIC METABOLIC PNL TOTAL CA: CPT | Performed by: STUDENT IN AN ORGANIZED HEALTH CARE EDUCATION/TRAINING PROGRAM

## 2023-10-09 PROCEDURE — 25010000002 KETOROLAC TROMETHAMINE PER 15 MG: Performed by: STUDENT IN AN ORGANIZED HEALTH CARE EDUCATION/TRAINING PROGRAM

## 2023-10-09 PROCEDURE — 63710000001 DIPHENHYDRAMINE PER 50 MG: Performed by: STUDENT IN AN ORGANIZED HEALTH CARE EDUCATION/TRAINING PROGRAM

## 2023-10-09 RX ORDER — SODIUM CHLORIDE 0.9 % (FLUSH) 0.9 %
10 SYRINGE (ML) INJECTION AS NEEDED
Status: DISCONTINUED | OUTPATIENT
Start: 2023-10-09 | End: 2023-10-09 | Stop reason: HOSPADM

## 2023-10-09 RX ORDER — DOXYCYCLINE 100 MG/1
100 CAPSULE ORAL 2 TIMES DAILY
Qty: 20 CAPSULE | Refills: 0 | Status: SHIPPED | OUTPATIENT
Start: 2023-10-09 | End: 2023-10-19

## 2023-10-09 RX ORDER — METHYLPREDNISOLONE SODIUM SUCCINATE 125 MG/2ML
125 INJECTION, POWDER, LYOPHILIZED, FOR SOLUTION INTRAMUSCULAR; INTRAVENOUS ONCE
Status: COMPLETED | OUTPATIENT
Start: 2023-10-09 | End: 2023-10-09

## 2023-10-09 RX ORDER — METHYLPREDNISOLONE 4 MG/1
TABLET ORAL
Qty: 21 TABLET | Refills: 0 | Status: SHIPPED | OUTPATIENT
Start: 2023-10-09

## 2023-10-09 RX ORDER — DIPHENHYDRAMINE HCL 25 MG
25 TABLET ORAL EVERY 6 HOURS PRN
Qty: 20 TABLET | Refills: 0 | Status: SHIPPED | OUTPATIENT
Start: 2023-10-09 | End: 2023-10-14

## 2023-10-09 RX ORDER — DIPHENHYDRAMINE HCL 25 MG
25 CAPSULE ORAL ONCE
Status: COMPLETED | OUTPATIENT
Start: 2023-10-09 | End: 2023-10-09

## 2023-10-09 RX ORDER — KETOROLAC TROMETHAMINE 15 MG/ML
15 INJECTION, SOLUTION INTRAMUSCULAR; INTRAVENOUS ONCE
Status: COMPLETED | OUTPATIENT
Start: 2023-10-09 | End: 2023-10-09

## 2023-10-09 RX ADMIN — DIPHENHYDRAMINE HYDROCHLORIDE 25 MG: 25 CAPSULE ORAL at 04:30

## 2023-10-09 RX ADMIN — METHYLPREDNISOLONE SODIUM SUCCINATE 125 MG: 125 INJECTION, POWDER, LYOPHILIZED, FOR SOLUTION INTRAMUSCULAR; INTRAVENOUS at 04:30

## 2023-10-09 RX ADMIN — KETOROLAC TROMETHAMINE 15 MG: 15 INJECTION, SOLUTION INTRAMUSCULAR; INTRAVENOUS at 04:30

## 2023-10-09 NOTE — DISCHARGE INSTRUCTIONS
Today you are seen for symptoms which I think are due to poison ivy dermatitis.  I prescribed some steroids which should help but still take some time for this to go away.  I do not think you are having an allergic reaction to your Bactrim given your history.  I have added a medicine called doxycycline which treat helps treat cellulitis.  Your labs otherwise were unremarkable.  As discussed is extremely poor and they follow with primary care provider as following with urgent cares and ERs makes it challenging as you can see a different provider every time which makes it hard to follow the changes given you have had a lot of changes over the past couple weeks.  I have listed primary care providers above that she can follow-up and also have a list below of all the clinics which you can call to schedule appointment.  If any of your symptoms worsen prior please immediately return to the emergency department.    Follow up with one of the [facility] physician groups below to setup primary care. If you have trouble making an appointment, please call the [facility] Nurse Line at [phone]    [facility] - [address]  [id number] [phone]    [facility] - [address], [address], [place] [address] [phone]    [facility] - [address], [address], [place], [place] [address] [phone]     [facility] ([facility]) - [place]  [address], [address], [place], [place] [address] [phone]    [facility] - [address], [address] [phone]    [facility] - [place] [address], [place] [address] [phone]    [facility] -  [place]  [address], [place], [address] [phone]    [facility] - [place]  [address], [address]  [phone]    [facility] - [address] [address] [phone]    [facility] - [place]  [address] [place] [address], [address], [place], [place], [address] [phone]        PEDIATRIC:    [facility] - [address], [address], [place] [address] [phone]

## 2023-10-09 NOTE — ED PROVIDER NOTES
"EMERGENCY DEPARTMENT ATTENDING NOTE    Patient Name: Shade Maravilla    Chief Complaint   Patient presents with    Rash    Leg Swelling       PATIENT PRESENTATION:  Shade Maravilla is a very pleasant 48 y.o. male presenting to the emergency department due to a rash.    About 2 weeks ago patient hit his right leg on some rebar and then started having an infection his leg is here for cellulitis initially [facility] and then presented to an urgent care has been on antibiotics most recently Bactrim.  Of note, he recently was carrying a push which he suspects was poison ivy started having a rash on his right and left upper arms and has spread all over his body states that the only place he does not at this point is his face and his genitals which he has not touched of note due to concerns that it could be poison ivy and did not want to get it in those areas.  He does not have a history of any allergies.  Denies any chest pain shortness of breath any nausea vomiting and difficulty breathing.  She has been on multiple antibiotics most recently Bactrim.      PHYSICAL EXAM:   VS: /86 (BP Location: Right arm, Patient Position: Sitting)   Pulse 86   Temp 97.2 øF (36.2 øC) (Oral)   Resp 15   Ht 177.8 cm (70\")   Wt 108 kg (238 lb)   SpO2 99%   BMI 34.15 kg/mý   GENERAL: Well-appearing middle-aged gentleman sitting up in stretcher scratching in his arms; otherwise well-nourished, well-developed, awake, alert, no acute distress, nontoxic appearing, comfortable  EYES: PERRL, sclerae anicteric, extraocular movements grossly intact, symmetric lids  EARS, NOSE, MOUTH, THROAT: atraumatic external nose and ears, moist mucous membranes; no oral lesions  NECK: symmetric, trachea midline  RESPIRATORY: unlabored respiratory effort, clear to auscultation bilaterally, good air movement; no inspiratory expiratory wheezing  CARDIOVASCULAR: no murmurs, peripheral pulses 2+ and equal in all extremities  GI: soft, nontender, " nondistended  MUSCULOSKELETAL/EXTREMITIES: extremities without obvious deformity  SKIN: Diffuse urticaria throughout his arms chest abdomen and legs; evidence of clear cellulitis with central eschar of his right lower extremity in the photos below; skin dry                PSYCHIATRIC: alert, pleasant and cooperative. Appropriate mood and affect.      MEDICAL DECISION MAKING:    Shade Maravilla is a 48 y.o. male with prior history of cellulitis in setting of rebar hitting his right lower extremity status post antibiotics as well as a more recent history of exposure to poison ivy presenting the emergency department due to itching rash.    Differential Diagnosis Considered: Poison ivy dermatitis, cellulitis, medication induced allergic reaction    Labs Ordered:  Labs Reviewed   BASIC METABOLIC PANEL - Abnormal; Notable for the following components:       Result Value    Glucose 104 (*)     All other components within normal limits    Narrative:     GFR Normal >60  Chronic Kidney Disease <60  Kidney Failure <15     CBC WITH AUTO DIFFERENTIAL - Abnormal; Notable for the following components:    Eosinophil % 19.2 (*)     Eosinophils, Absolute 1.47 (*)     All other components within normal limits   BNP (IN-HOUSE) - Normal    Narrative:     This assay is used as an aid in the diagnosis of individuals suspected of having heart failure. It can be used as an aid in the diagnosis of acute decompensated heart failure (ADHF) in patients presenting with signs and symptoms of ADHF to the emergency department (ED). In addition, NT-proBNP of <300 pg/mL indicates ADHF is not likely.    Age Range Result Interpretation  NT-proBNP Concentration (pg/mL:      <50             Positive            >450                   Gray                 300-450                    Negative             <300    50-75           Positive            >900                  Gray                300-900                  Negative            <300      >75              "Positive            >1800                  Gray                300-1800                  Negative            <300   CBC AND DIFFERENTIAL    Narrative:     The following orders were created for panel order CBC & Differential.  Procedure                               Abnormality         Status                     ---------                               -----------         ------                     CBC Auto Differential[[id number]]        Abnormal            Final result                 Please view results for these tests on the individual orders.      Internal chart review:   History reviewed. No pertinent past medical history.    Past Surgical History:   Procedure Laterality Date    BACK SURGERY      LUMBAR LAMINECTOMY      L5-S1    LUMBAR LAMINECTOMY ANTERIOR LUMBAR INTERBODY FUSION      L5-S1    POLYPECTOMY      RECTAL FISTULOTOMY         Allergies   Allergen Reactions    Gabapentin Mental Status Change     \"like a date rape drug for me. Felt drunk\" for 2 days         Current Facility-Administered Medications:     [COMPLETED] Insert Peripheral IV, , , Once **AND** sodium chloride 0.9 % flush 10 mL, 10 mL, Intravenous, PRN, Rogelio Garza MD    Current Outpatient Medications:     diphenhydrAMINE (BENADRYL) 25 MG tablet, Take 1 tablet by mouth Every 6 (Six) Hours As Needed for Itching for up to 5 days., Disp: 20 tablet, Rfl: 0    doxycycline (MONODOX) 100 MG capsule, Take 1 capsule by mouth 2 (Two) Times a Day for 10 days., Disp: 20 capsule, Rfl: 0    methylPREDNISolone (MEDROL) 4 MG dose pack, Take as directed on package instructions., Disp: 21 tablet, Rfl: 0    mupirocin (BACTROBAN) 2 % ointment, Apply 1 application  topically to the appropriate area as directed 3 (Three) Times a Day., Disp: 22 g, Rfl: 0    sulfamethoxazole-trimethoprim (BACTRIM DS,SEPTRA DS) 800-160 MG per tablet, Take 1 tablet by mouth 2 (Two) Times a Day for 10 days., Disp: 20 tablet, Rfl: 0    My lab interpretation: Normal blood count " hemoglobin.  BMP is unremarkable.  Negative BNP.    My imaging interpretation: I performed a point-of-care ultrasound over the patient's area of cellulitis there is some heterogeneous echogenicity is but not concerning for any significant abscess mostly cobblestoning consistent with cellulitis.        ED Course and Re-evaluation: 47yo M presenting to the emergency department due to rash in the setting of prior tumor cellulitis.  Given his history I do not think this is a true antibiotic allergy to Bactrim.  He has clear exposure to poison ivy and the migratory nature of this associated him itching in his skin constantly is highly suspicious for poison ivy dermatitis.  His exam is also consistent poison ivy dermatitis and this does not appear to be a drug rash.  Regarding his cellulitis difficult to evaluate interval improvement given this is my first time examining this patient.  I performed a point-of-care ultrasound to assure there is no underlying abscess at best a very tiny punctate abscess but mostly cellulitis on my point-of-care ultrasound.  No indication for incision and drainage.  Patient seen multiple vitals and urgent cares in the emergency department so his care is all over the place and I highly recommended that he follow-up with a primary care providers at the same person can continue to reexamine him to assess true interval change.  Patient given steroids and Benadryl for treatment of his poison ivy dermatitis.  Added doxycycline given his persistent cellulitis.  His labs are reassuring he has no signs of systemic infection or sepsis his white blood cell count is normal.  Counseled patient regarding all of the results provided him with primary care providers that he can follow-up and he was discharged prescription for Medrol Dosepak as well as doxycycline and Benadryl with plan to follow with a primary care provider soon as possible for continued reevaluations and wound checks and given return  precautions to the emergency department for worsening symptoms.      ED Diagnosis:  Cellulitis of right lower extremity; History of cellulitis; Poison ivy dermatitis    Disposition: to home  Follow up plan: PCP follow up within 2 days, return to ED immediately if symptoms worsen        Signed:  Rogelio Garza MD  Emergency Medicine Physician    Please note that portions of this note were completed with a voice recognition program.      Rogelio Garza MD  10/09/23 0653

## 2023-10-17 ENCOUNTER — PATIENT OUTREACH (OUTPATIENT)
Dept: CASE MANAGEMENT | Facility: OTHER | Age: 48
End: 2023-10-17
Payer: COMMERCIAL

## 2023-10-17 NOTE — OUTREACH NOTE
AMBULATORY CASE MANAGEMENT NOTE    Name and Relationship of Patient/Support Person: Shade Maravilla - Self    Patient Outreach    Spoke patient regarding his current skin issues and wound. He indicates the cellulitis is much improved. We discussed his need to see his PCP and he would like an appointment.    Education Documentation  No documentation found.        Rossana DOZIER  Ambulatory Case Management    10/17/2023, 11:33 CDT

## 2023-10-18 ENCOUNTER — OFFICE VISIT (OUTPATIENT)
Dept: FAMILY MEDICINE CLINIC | Facility: CLINIC | Age: 48
End: 2023-10-18
Payer: COMMERCIAL

## 2023-10-18 VITALS
HEART RATE: 101 BPM | OXYGEN SATURATION: 100 % | SYSTOLIC BLOOD PRESSURE: 144 MMHG | WEIGHT: 226 LBS | BODY MASS INDEX: 32.35 KG/M2 | DIASTOLIC BLOOD PRESSURE: 84 MMHG | HEIGHT: 70 IN

## 2023-10-18 DIAGNOSIS — L03.116 CELLULITIS OF LEG WITHOUT FOOT, LEFT: ICD-10-CM

## 2023-10-18 DIAGNOSIS — L03.115 CELLULITIS OF RIGHT LOWER EXTREMITY: ICD-10-CM

## 2023-10-18 DIAGNOSIS — L23.7 POISON IVY DERMATITIS: Primary | ICD-10-CM

## 2023-10-18 PROCEDURE — 99213 OFFICE O/P EST LOW 20 MIN: CPT | Performed by: PEDIATRICS

## 2023-10-18 NOTE — PROGRESS NOTES
"Chief Complaint  ER Follow-Up and Cellulitis    Subjective    History of Present Illness      Patient presents to [facility] for   History of Present Illness  Pt is here today for ER f/u for wound care on his right shin and left calf. He was also treated for full body poison ivy at that time. Rash has gone. Some spotting left on his body from this.        Review of Systems    I have reviewed and agree with the HPI information as above.  Orlando Ponce MD     Objective   Vital Signs:   /84   Pulse 101   Ht 177.8 cm (70\")   Wt 103 kg (226 lb)   SpO2 100%   BMI 32.43 kg/m²     BMI is >= 30 and <35. (Class 1 Obesity). The following options were offered after discussion;: nutrition counseling/recommendations      Physical Exam  Constitutional:       Appearance: Normal appearance. He is normal weight.   Cardiovascular:      Rate and Rhythm: Normal rate and regular rhythm.      Heart sounds: Normal heart sounds.   Pulmonary:      Effort: Pulmonary effort is normal.      Breath sounds: Normal breath sounds.   Musculoskeletal:        Legs:       Comments: Healing puncture wound and cellulitis    \his poison ivy rash is mostly gone and non symptomatic   Neurological:      Mental Status: He is alert.   Psychiatric:         Mood and Affect: Mood normal.         Behavior: Behavior normal.             Result Review  Data Reviewed:              ED with Rogelio Garza MD (10/09/2023)      Assessment and Plan      Diagnoses and all orders for this visit:    1. Poison ivy dermatitis (Primary)  Assessment & Plan:  Healing well after er visit and treatment,  rec otc ivy wash      2. Cellulitis of leg without foot, left  Assessment & Plan:  Healing well  the cellulitis is gone      3. Cellulitis of right lower extremity  Assessment & Plan:  Improving,  the area is healing by secondary intention   no more tenderness or cellulitis              Follow Up   No follow-ups on file.  Patient was " given instructions and counseling regarding his condition or for health maintenance advice. Please see specific information pulled into the AVS if appropriate.

## 2024-02-24 ENCOUNTER — APPOINTMENT (OUTPATIENT)
Dept: CT IMAGING | Facility: HOSPITAL | Age: 49
End: 2024-02-24
Payer: COMMERCIAL

## 2024-02-24 ENCOUNTER — HOSPITAL ENCOUNTER (EMERGENCY)
Facility: HOSPITAL | Age: 49
Discharge: HOME | End: 2024-02-24
Attending: INTERNAL MEDICINE
Payer: COMMERCIAL

## 2024-02-24 VITALS
RESPIRATION RATE: 18 BRPM | TEMPERATURE: 98.4 F | BODY MASS INDEX: 32.93 KG/M2 | SYSTOLIC BLOOD PRESSURE: 116 MMHG | WEIGHT: 230 LBS | OXYGEN SATURATION: 96 % | DIASTOLIC BLOOD PRESSURE: 72 MMHG | HEART RATE: 99 BPM | HEIGHT: 70 IN

## 2024-02-24 DIAGNOSIS — M54.50 ACUTE LOW BACK PAIN WITHOUT SCIATICA, UNSPECIFIED BACK PAIN LATERALITY: Primary | ICD-10-CM

## 2024-02-24 LAB
ALBUMIN SERPL-MCNC: 4.2 G/DL (ref 3.5–5.2)
ALBUMIN/GLOB SERPL: 1.2 G/DL
ALP SERPL-CCNC: 110 U/L (ref 39–117)
ALT SERPL W P-5'-P-CCNC: 26 U/L (ref 1–41)
ANION GAP SERPL CALCULATED.3IONS-SCNC: 10 MMOL/L (ref 5–15)
AST SERPL-CCNC: 29 U/L (ref 1–40)
BASOPHILS # BLD AUTO: 0.02 10*3/MM3 (ref 0–0.2)
BASOPHILS NFR BLD AUTO: 0.2 % (ref 0–1.5)
BILIRUB SERPL-MCNC: 0.3 MG/DL (ref 0–1.2)
BUN SERPL-MCNC: 14 MG/DL (ref 6–20)
BUN/CREAT SERPL: 13.5 (ref 7–25)
CALCIUM SPEC-SCNC: 9.4 MG/DL (ref 8.6–10.5)
CHLORIDE SERPL-SCNC: 101 MMOL/L (ref 98–107)
CO2 SERPL-SCNC: 27 MMOL/L (ref 22–29)
CREAT SERPL-MCNC: 1.04 MG/DL (ref 0.76–1.27)
CRP SERPL-MCNC: 7.92 MG/DL (ref 0–0.5)
D-LACTATE SERPL-SCNC: 1.5 MMOL/L (ref 0.5–2)
DEPRECATED RDW RBC AUTO: 43.4 FL (ref 37–54)
EGFRCR SERPLBLD CKD-EPI 2021: 88.6 ML/MIN/1.73
EOSINOPHIL # BLD AUTO: 0.15 10*3/MM3 (ref 0–0.4)
EOSINOPHIL NFR BLD AUTO: 1.5 % (ref 0.3–6.2)
ERYTHROCYTE [DISTWIDTH] IN BLOOD BY AUTOMATED COUNT: 13.6 % (ref 12.3–15.4)
ERYTHROCYTE [SEDIMENTATION RATE] IN BLOOD: 19 MM/HR (ref 0–15)
GLOBULIN UR ELPH-MCNC: 3.6 GM/DL
GLUCOSE SERPL-MCNC: 96 MG/DL (ref 65–99)
HCT VFR BLD AUTO: 45.4 % (ref 37.5–51)
HGB BLD-MCNC: 14.9 G/DL (ref 13–17.7)
IMM GRANULOCYTES # BLD AUTO: 0.03 10*3/MM3 (ref 0–0.05)
IMM GRANULOCYTES NFR BLD AUTO: 0.3 % (ref 0–0.5)
LYMPHOCYTES # BLD AUTO: 0.85 10*3/MM3 (ref 0.7–3.1)
LYMPHOCYTES NFR BLD AUTO: 8.7 % (ref 19.6–45.3)
MCH RBC QN AUTO: 28.6 PG (ref 26.6–33)
MCHC RBC AUTO-ENTMCNC: 32.8 G/DL (ref 31.5–35.7)
MCV RBC AUTO: 87.1 FL (ref 79–97)
MONOCYTES # BLD AUTO: 0.45 10*3/MM3 (ref 0.1–0.9)
MONOCYTES NFR BLD AUTO: 4.6 % (ref 5–12)
NEUTROPHILS NFR BLD AUTO: 8.27 10*3/MM3 (ref 1.7–7)
NEUTROPHILS NFR BLD AUTO: 84.7 % (ref 42.7–76)
NRBC BLD AUTO-RTO: 0 /100 WBC (ref 0–0.2)
PLATELET # BLD AUTO: 271 10*3/MM3 (ref 140–450)
PMV BLD AUTO: 8.8 FL (ref 6–12)
POTASSIUM SERPL-SCNC: 4.3 MMOL/L (ref 3.5–5.2)
PROT SERPL-MCNC: 7.8 G/DL (ref 6–8.5)
RBC # BLD AUTO: 5.21 10*6/MM3 (ref 4.14–5.8)
SODIUM SERPL-SCNC: 138 MMOL/L (ref 136–145)
WBC NRBC COR # BLD AUTO: 9.77 10*3/MM3 (ref 3.4–10.8)

## 2024-02-24 PROCEDURE — 72131 CT LUMBAR SPINE W/O DYE: CPT

## 2024-02-24 PROCEDURE — 25010000002 ORPHENADRINE CITRATE PER 60 MG: Performed by: INTERNAL MEDICINE

## 2024-02-24 PROCEDURE — 96375 TX/PRO/DX INJ NEW DRUG ADDON: CPT

## 2024-02-24 PROCEDURE — 85025 COMPLETE CBC W/AUTO DIFF WBC: CPT | Performed by: INTERNAL MEDICINE

## 2024-02-24 PROCEDURE — 86140 C-REACTIVE PROTEIN: CPT | Performed by: INTERNAL MEDICINE

## 2024-02-24 PROCEDURE — 80053 COMPREHEN METABOLIC PANEL: CPT | Performed by: INTERNAL MEDICINE

## 2024-02-24 PROCEDURE — 96374 THER/PROPH/DIAG INJ IV PUSH: CPT

## 2024-02-24 PROCEDURE — 25010000002 HYDROMORPHONE PER 4 MG: Performed by: INTERNAL MEDICINE

## 2024-02-24 PROCEDURE — 85652 RBC SED RATE AUTOMATED: CPT | Performed by: INTERNAL MEDICINE

## 2024-02-24 PROCEDURE — 83605 ASSAY OF LACTIC ACID: CPT | Performed by: INTERNAL MEDICINE

## 2024-02-24 PROCEDURE — 99284 EMERGENCY DEPT VISIT MOD MDM: CPT

## 2024-02-24 PROCEDURE — 25010000002 ONDANSETRON PER 1 MG: Performed by: INTERNAL MEDICINE

## 2024-02-24 PROCEDURE — 25010000002 DEXAMETHASONE PER 1 MG: Performed by: INTERNAL MEDICINE

## 2024-02-24 RX ORDER — CYCLOBENZAPRINE HCL 10 MG
10 TABLET ORAL 3 TIMES DAILY PRN
Qty: 15 TABLET | Refills: 0 | Status: SHIPPED | OUTPATIENT
Start: 2024-02-24

## 2024-02-24 RX ORDER — OXYCODONE AND ACETAMINOPHEN 7.5; 325 MG/1; MG/1
1 TABLET ORAL ONCE
Status: COMPLETED | OUTPATIENT
Start: 2024-02-24 | End: 2024-02-24

## 2024-02-24 RX ORDER — OXYCODONE HYDROCHLORIDE AND ACETAMINOPHEN 5; 325 MG/1; MG/1
1 TABLET ORAL EVERY 6 HOURS PRN
Qty: 10 TABLET | Refills: 0 | Status: SHIPPED | OUTPATIENT
Start: 2024-02-24

## 2024-02-24 RX ORDER — ORPHENADRINE CITRATE 30 MG/ML
30 INJECTION INTRAMUSCULAR; INTRAVENOUS ONCE
Status: COMPLETED | OUTPATIENT
Start: 2024-02-24 | End: 2024-02-24

## 2024-02-24 RX ORDER — ONDANSETRON 2 MG/ML
4 INJECTION INTRAMUSCULAR; INTRAVENOUS ONCE
Status: COMPLETED | OUTPATIENT
Start: 2024-02-24 | End: 2024-02-24

## 2024-02-24 RX ORDER — METHYLPREDNISOLONE 4 MG/1
TABLET ORAL
Qty: 21 TABLET | Refills: 0 | Status: SHIPPED | OUTPATIENT
Start: 2024-02-24

## 2024-02-24 RX ORDER — HYDROMORPHONE HYDROCHLORIDE 1 MG/ML
0.5 INJECTION, SOLUTION INTRAMUSCULAR; INTRAVENOUS; SUBCUTANEOUS ONCE
Status: COMPLETED | OUTPATIENT
Start: 2024-02-24 | End: 2024-02-24

## 2024-02-24 RX ORDER — DEXAMETHASONE SODIUM PHOSPHATE 10 MG/ML
8 INJECTION INTRAMUSCULAR; INTRAVENOUS ONCE
Status: COMPLETED | OUTPATIENT
Start: 2024-02-24 | End: 2024-02-24

## 2024-02-24 RX ADMIN — OXYCODONE HYDROCHLORIDE AND ACETAMINOPHEN 1 TABLET: 7.5; 325 TABLET ORAL at 20:27

## 2024-02-24 RX ADMIN — ORPHENADRINE CITRATE 30 MG: 60 INJECTION INTRAMUSCULAR; INTRAVENOUS at 16:55

## 2024-02-24 RX ADMIN — DEXAMETHASONE SODIUM PHOSPHATE 8 MG: 10 INJECTION INTRAMUSCULAR; INTRAVENOUS at 16:56

## 2024-02-24 RX ADMIN — HYDROMORPHONE HYDROCHLORIDE 0.5 MG: 1 INJECTION, SOLUTION INTRAMUSCULAR; INTRAVENOUS; SUBCUTANEOUS at 16:53

## 2024-02-24 RX ADMIN — ONDANSETRON 4 MG: 2 INJECTION INTRAMUSCULAR; INTRAVENOUS at 16:52

## 2024-02-24 NOTE — ED PROVIDER NOTES
"Subjective   History of Present Illness  48-year-old male presents emergency department with complaints of low back pain.  He still has his work close on from yesterday.  He states any movement is no good.  He started his day yesterday around 430.  He states he was up and moving from 4 30-8.  He took a break.  When he got up from his break he was walking crazy in the parking lot due to the position that he had to maintain to get out of pain.  He took Tylenol and it helped some.  He notes decreased productivity throughout the day.  When he got home he could do some chores.  He laid down at 10 PM last night and notes he has not been up since.  He has had 2 back surgeries in the past.  1 with Dr. Escalera at our facility and 1 with a doctor at [place].  He states this is different pain than his sciatica pain.  He has no pain at the SI joint.  He points across his back.  He believes like his L4 is injured.  1 month ago he did have a fall but this was mostly on his right shoulder.    Review of Systems   Constitutional:  Negative for chills and fever.   Musculoskeletal:  Positive for arthralgias and back pain.       History reviewed. No pertinent past medical history.    Allergies   Allergen Reactions    Gabapentin Mental Status Change     \"like a date rape drug for me. Felt drunk\" for 2 days       Past Surgical History:   Procedure Laterality Date    BACK SURGERY      LUMBAR LAMINECTOMY      L5-S1    LUMBAR LAMINECTOMY ANTERIOR LUMBAR INTERBODY FUSION      L5-S1    POLYPECTOMY      RECTAL FISTULOTOMY         History reviewed. No pertinent family history.    Social History     Socioeconomic History    Marital status:    Tobacco Use    Smoking status: Former     Packs/day: 0.50     Years: 22.00     Additional pack years: 0.00     Total pack years: 11.00     Types: Cigarettes     Quit date: 2022     Years since quittin.2    Smokeless tobacco: Never   Substance and Sexual Activity    Alcohol use: Not Currently "    Drug use: Not Currently           Objective   Physical Exam  Vitals reviewed.   Constitutional:       General: He is in acute distress.      Appearance: He is ill-appearing.   HENT:      Head: Normocephalic and atraumatic.      Right Ear: External ear normal.      Left Ear: External ear normal.      Nose: Nose normal.   Eyes:      General: No scleral icterus.     Conjunctiva/sclera: Conjunctivae normal.   Cardiovascular:      Rate and Rhythm: Normal rate and regular rhythm.      Heart sounds: Normal heart sounds.   Pulmonary:      Effort: Pulmonary effort is normal. No respiratory distress.   Musculoskeletal:         General: No tenderness.      Cervical back: Normal range of motion and neck supple.      Comments: Limited range of motion due to patient's pain and position.  He is lying on his left side with his knees curled up and a pillow in between.  He has pain across his entire lumbar area.  He has no pain in the sciatic area on the right.  He has no pain in the ITB on the right.   Skin:     General: Skin is warm and dry.   Neurological:      Mental Status: He is alert.      Cranial Nerves: No cranial nerve deficit.      Sensory: No sensory deficit.      Motor: No weakness.      Coordination: Coordination normal.   Psychiatric:         Mood and Affect: Mood normal.         Behavior: Behavior normal.         Procedures       Lab Results (last 24 hours)       Procedure Component Value Units Date/Time    CBC & Differential [[id number]]  (Abnormal) Collected: 02/24/24 1647    Specimen: Blood Updated: 02/24/24 1700    Narrative:      The following orders were created for panel order CBC & Differential.  Procedure                               Abnormality         Status                     ---------                               -----------         ------                     CBC Auto Differential[[id number]]        Abnormal            Final result                 Please view results for these tests on the individual  orders.    Comprehensive Metabolic Panel [[id number]] Collected: 02/24/24 1647    Specimen: Blood Updated: 02/24/24 1720     Glucose 96 mg/dL      BUN 14 mg/dL      Creatinine 1.04 mg/dL      Sodium 138 mmol/L      Potassium 4.3 mmol/L      Comment: Specimen hemolyzed.  Result may be falsely elevated.        Chloride 101 mmol/L      CO2 27.0 mmol/L      Calcium 9.4 mg/dL      Total Protein 7.8 g/dL      Albumin 4.2 g/dL      ALT (SGPT) 26 U/L      Comment: Specimen hemolyzed.  Result may  be falsely elevated.        AST (SGOT) 29 U/L      Comment: Specimen hemolyzed.  Result may be falsely elevated.        Alkaline Phosphatase 110 U/L      Total Bilirubin 0.3 mg/dL      Globulin 3.6 gm/dL      A/G Ratio 1.2 g/dL      BUN/Creatinine Ratio 13.5     Anion Gap 10.0 mmol/L      eGFR 88.6 mL/min/1.73     Narrative:      GFR Normal >60  Chronic Kidney Disease <60  Kidney Failure <15      Sedimentation Rate [[id number]]  (Abnormal) Collected: 02/24/24 1647    Specimen: Blood Updated: 02/24/24 1713     Sed Rate 19 mm/hr     C-reactive Protein [[id number]]  (Abnormal) Collected: 02/24/24 1647    Specimen: Blood Updated: 02/24/24 1721     C-Reactive Protein 7.92 mg/dL     Lactic Acid, Plasma [[id number]]  (Normal) Collected: 02/24/24 1647    Specimen: Blood Updated: 02/24/24 1716     Lactate 1.5 mmol/L     CBC Auto Differential [[id number]]  (Abnormal) Collected: 02/24/24 1647    Specimen: Blood Updated: 02/24/24 1700     WBC 9.77 10*3/mm3      RBC 5.21 10*6/mm3      Hemoglobin 14.9 g/dL      Hematocrit 45.4 %      MCV 87.1 fL      MCH 28.6 pg      MCHC 32.8 g/dL      RDW 13.6 %      RDW-SD 43.4 fl      MPV 8.8 fL      Platelets 271 10*3/mm3      Neutrophil % 84.7 %      Lymphocyte % 8.7 %      Monocyte % 4.6 %      Eosinophil % 1.5 %      Basophil % 0.2 %      Immature Grans % 0.3 %      Neutrophils, Absolute 8.27 10*3/mm3      Lymphocytes, Absolute 0.85 10*3/mm3      Monocytes, Absolute 0.45 10*3/mm3      Eosinophils, Absolute  0.15 10*3/mm3      Basophils, Absolute 0.02 10*3/mm3      Immature Grans, Absolute 0.03 10*3/mm3      nRBC 0.0 /100 WBC           CT Lumbar Spine Without Contrast   Final Result   1. No acute osseous injury or malalignment.    2. Underlying L4-L5 level osteoarthritis changes, as described above.   3. Previous L5-S1 level ALIF. No hardware complication.   4. 5 mm nonobstructing left renal stone.               This report was signed and finalized on 2/24/2024 6:15 PM by Dr Rangel Rg.                ED Course  ED Course as of 02/24/24 2143   Sat Feb 24, 2024   1825 Follow-up on patient.  Family is not present.  Patient is lying on his back now rather than his side and resting comfortably.  When I woke him, he was slightly disoriented, but did not appear to be in pain. [AJ]   2022 Follow-up on patient.  He notes that he is feeling some better, but still unable to sit up.  Will order Percocet. [AJ]   2132 Patient resting comfortably.  I discussed with his so at bedside.  She notes that she would like to go home and see if he can tolerate steroids, muscle relaxers, and pain medication at home.  I did discuss with him that they would need to call either his group in [place], or our neurosurgery group for follow-up on Monday.  She voiced understanding and was agreeable with the plan of discharge. [AJ]      ED Course User Index  [AJ] Tracee Ramon DO                                             Medical Decision Making  Differentials include discitis, disc bulge, lumbar strain    Problems Addressed:  Acute low back pain without sciatica, unspecified back pain laterality: complicated acute illness or injury    Amount and/or Complexity of Data Reviewed  Labs: ordered.     Details: CBC CMP ESR CRP  Radiology: ordered.     Details: CT lumbar spine    Risk  Prescription drug management.        Final diagnoses:   Acute low back pain without sciatica, unspecified back pain laterality       ED Disposition  ED Disposition        ED Disposition   Discharge    Condition   Stable    Comment   --               Orlando Ponce MD  [address]   [address]  [facility] [address]  [phone]    In 2 days           Medication List        New Prescriptions      cyclobenzaprine 10 MG tablet  Commonly known as: FLEXERIL  Take 1 tablet by mouth 3 (Three) Times a Day As Needed for Muscle Spasms.     oxyCODONE-acetaminophen 5-325 MG per tablet  Commonly known as: Percocet  Take 1 tablet by mouth Every 6 (Six) Hours As Needed for Moderate Pain.            Changed      * methylPREDNISolone 4 MG dose pack  Commonly known as: MEDROL  Take as directed on package instructions.  What changed: Another medication with the same name was added. Make sure you understand how and when to take each.     * methylPREDNISolone 4 MG dose pack  Commonly known as: MEDROL  Take as directed on package instructions.  What changed: You were already taking a medication with the same name, and this prescription was added. Make sure you understand how and when to take each.           * This list has 2 medication(s) that are the same as other medications prescribed for you. Read the directions carefully, and ask your doctor or other care provider to review them with you.                   Where to Get Your Medications        These medications were sent to [id number] - [place], [place] - [address] AT ACROSS FROM [address]  [phone] [facility] [phone]   [address] [facility] [address]      Phone: [phone]   cyclobenzaprine 10 MG tablet  methylPREDNISolone 4 MG dose pack  oxyCODONE-acetaminophen 5-325 MG per tablet            Tracee Ramon, DO  02/24/24 1631       Tracee Ramon, DO  02/24/24 2148

## 2024-02-24 NOTE — Clinical Note
[facility] EMERGENCY DEPARTMENT  [address]  [facility] [address]  Phone: [phone]    Shade Maravilla was seen and treated in our emergency department on 2/24/2024.  He may return to work on 02/29/2024.         Thank you for choosing [facility].    Tracee Ramon DO

## 2024-02-24 NOTE — Clinical Note
[facility] EMERGENCY DEPARTMENT  [address]  [facility] [address]  Phone: [phone]    Shade Maravilla was seen and treated in our emergency department on 2/24/2024.  He may return to work on 02/29/2024.         Thank you for choosing [facility].    Tracee Ramon DO

## 2024-02-25 NOTE — DISCHARGE INSTRUCTIONS
Follow-up with PCP next week if possible.  Call neurosurgery of your choice on Monday to discuss follow-up for you back pain.  Muscle relaxer, pain medication, and steroids were called into your pharmacy this evening as we discussed.  Return to the emergency department if symptoms worsen or fail to improve.

## 2024-02-28 ENCOUNTER — HOSPITAL ENCOUNTER (EMERGENCY)
Facility: HOSPITAL | Age: 49
Discharge: HOME | End: 2024-02-29
Admitting: EMERGENCY MEDICINE
Payer: COMMERCIAL

## 2024-02-28 DIAGNOSIS — M19.90 OSTEOARTHRITIS, UNSPECIFIED OSTEOARTHRITIS TYPE, UNSPECIFIED SITE: ICD-10-CM

## 2024-02-28 DIAGNOSIS — S39.012A STRAIN OF LUMBAR REGION, INITIAL ENCOUNTER: Primary | ICD-10-CM

## 2024-02-28 PROCEDURE — 99283 EMERGENCY DEPT VISIT LOW MDM: CPT

## 2024-02-28 PROCEDURE — 96372 THER/PROPH/DIAG INJ SC/IM: CPT

## 2024-02-28 PROCEDURE — 0 HYDROMORPHONE 1 MG/ML SOLUTION: Performed by: NURSE PRACTITIONER

## 2024-02-28 PROCEDURE — 63710000001 ONDANSETRON ODT 4 MG TABLET DISPERSIBLE: Performed by: NURSE PRACTITIONER

## 2024-02-28 RX ORDER — ONDANSETRON 4 MG/1
4 TABLET, ORALLY DISINTEGRATING ORAL ONCE
Status: COMPLETED | OUTPATIENT
Start: 2024-02-28 | End: 2024-02-28

## 2024-02-28 RX ADMIN — HYDROMORPHONE HYDROCHLORIDE 1 MG: 1 INJECTION, SOLUTION INTRAMUSCULAR; INTRAVENOUS; SUBCUTANEOUS at 22:49

## 2024-02-28 RX ADMIN — ONDANSETRON 4 MG: 4 TABLET, ORALLY DISINTEGRATING ORAL at 22:49

## 2024-02-29 VITALS
WEIGHT: 230 LBS | HEIGHT: 70 IN | OXYGEN SATURATION: 96 % | SYSTOLIC BLOOD PRESSURE: 124 MMHG | TEMPERATURE: 98 F | RESPIRATION RATE: 20 BRPM | HEART RATE: 88 BPM | BODY MASS INDEX: 32.93 KG/M2 | DIASTOLIC BLOOD PRESSURE: 78 MMHG

## 2024-02-29 NOTE — ED PROVIDER NOTES
"Subjective   History of Present Illness  Patient is a 48-year-old male who presents to the ER with chief complaints of low back pain.  Patient was evaluated for similar symptoms on February 24, 2024.  He had a CT scan which was negative for any acute findings.  Patient was given muscle relaxant, steroid shot, and pain medication in the ER.  He was prescribed steroids, muscle relaxant, and pain medication and instructed to follow-up with spine specialist.  Patient continues to have low back pain.  He reports previous history of lumbar fusion.  He denies any known injury.  He complains of a pain described as a muscle spasm across his lower back.  He denies any radicular symptoms.  He has had no loss of bowel or bladder control or saddle anesthesia.  Past medical history significant for chronic back pain        Review of Systems   Constitutional: Negative.  Negative for fever.   HENT: Negative.  Negative for congestion.    Eyes: Negative.    Respiratory: Negative.  Negative for cough and shortness of breath.    Cardiovascular: Negative.  Negative for chest pain.   Gastrointestinal: Negative.  Negative for abdominal pain, constipation, diarrhea, nausea and vomiting.   Genitourinary: Negative.  Negative for difficulty urinating and dysuria.   Musculoskeletal:  Positive for back pain.   Neurological: Negative.  Negative for weakness and numbness.   All other systems reviewed and are negative.      No past medical history on file.    Allergies   Allergen Reactions    Gabapentin Mental Status Change     \"like a date rape drug for me. Felt drunk\" for 2 days       Past Surgical History:   Procedure Laterality Date    BACK SURGERY      LUMBAR LAMINECTOMY      L5-S1    LUMBAR LAMINECTOMY ANTERIOR LUMBAR INTERBODY FUSION      L5-S1    POLYPECTOMY      RECTAL FISTULOTOMY         No family history on file.    Social History     Socioeconomic History    Marital status:    Tobacco Use    Smoking status: Former     Packs/day: " 0.50     Years: 22.00     Additional pack years: 0.00     Total pack years: 11.00     Types: Cigarettes     Quit date: 2022     Years since quittin.2    Smokeless tobacco: Never   Substance and Sexual Activity    Alcohol use: Not Currently    Drug use: Not Currently           Objective   Physical Exam  Vitals and nursing note reviewed.   Constitutional:       General: He is not in acute distress.     Appearance: He is well-developed. He is not diaphoretic.   HENT:      Head: Normocephalic and atraumatic.      Right Ear: External ear normal.      Left Ear: External ear normal.      Nose: Nose normal.      Mouth/Throat:      Pharynx: Oropharynx is clear.   Eyes:      General: No scleral icterus.     Extraocular Movements: Extraocular movements intact.      Conjunctiva/sclera: Conjunctivae normal.      Pupils: Pupils are equal, round, and reactive to light.   Neck:      Thyroid: No thyromegaly.      Vascular: No JVD.   Cardiovascular:      Rate and Rhythm: Normal rate and regular rhythm.      Heart sounds: Normal heart sounds. No murmur heard.  Pulmonary:      Effort: Pulmonary effort is normal. No respiratory distress.      Breath sounds: Normal breath sounds. No wheezing or rales.   Chest:      Chest wall: No tenderness.   Abdominal:      General: Bowel sounds are normal. There is no distension.      Palpations: Abdomen is soft. There is no mass.      Tenderness: There is no abdominal tenderness. There is no guarding or rebound.   Musculoskeletal:         General: Tenderness present. Normal range of motion.      Cervical back: Normal range of motion and neck supple.      Comments: Patient has pain to palpation of the lumbar spine.  There is no step-off or vertebral point tenderness noted.  He complains of pain in particular to the soft tissues.  He describes the pain as a muscle spasm.  Range of motion is intact to his upper and lower extremities.  Motor strength is intact.  He denies any loss of bowel or  bladder control or saddle anesthesia.  Pulses are palpable bilaterally.   Lymphadenopathy:      Cervical: No cervical adenopathy.   Skin:     General: Skin is warm and dry.      Coloration: Skin is not pale.      Findings: No erythema or rash.   Neurological:      Mental Status: He is alert and oriented to person, place, and time.      Cranial Nerves: No cranial nerve deficit.      Coordination: Coordination normal.      Deep Tendon Reflexes: Reflexes are normal and symmetric.   Psychiatric:         Mood and Affect: Mood normal.         Behavior: Behavior normal.         Thought Content: Thought content normal.         Judgment: Judgment normal.         Procedures           ED Course                                             Medical Decision Making  Patient is a 48-year-old male who presents to the ER with chief complaints of low back pain.  Patient was evaluated for similar symptoms on February 24, 2024.  He had a CT scan which was negative for any acute findings.  Patient was given muscle relaxant, steroid shot, and pain medication in the ER.  He was prescribed steroids, muscle relaxant, and pain medication and instructed to follow-up with spine specialist.  Patient continues to have low back pain.  He reports previous history of lumbar fusion.  He denies any known injury.  He complains of a pain described as a muscle spasm across his lower back.  He denies any radicular symptoms.  He has had no loss of bowel or bladder control or saddle anesthesia.  Past medical history significant for chronic back pain  Differential diagnosis: Muscle spasm, arthritis, degenerative disc disease, and other    Per review patient had a CT scan just a few days ago with findings as described below.    IMPRESSION:  1. No acute osseous injury or malalignment.   2. Underlying L4-L5 level osteoarthritis changes, as described above.  3. Previous L5-S1 level ALIF. No hardware complication.  4. 5 mm nonobstructing left renal stone.      Patient denies any worsening symptoms.  He states the pain just has not simply gone away.  He has not followed up with spine specialist.  He is neurologically intact.  He denies any radicular symptoms.  He has no loss of bowel or bladder control or saddle anesthesia.  He was prescribed pain medication, muscle relaxant, and steroids for his back pain.  Patient is neurologically intact therefore he does not warrant an emergent MRI.  We do however recommend follow-up with spine specialist.  He received a pain shot in the emergency department.  He reports feeling better on reexam.  He will be discharged home shortly in stable condition.  We have prescribed anti-inflammatories which he may begin taking and given him a work excuse for the next few days and recommend no heavy lifting greater than 10 pounds until he follows up with a spine specialist.    Problems Addressed:  Osteoarthritis, unspecified osteoarthritis type, unspecified site: acute illness or injury  Strain of lumbar region, initial encounter: acute illness or injury    Amount and/or Complexity of Data Reviewed  Radiology: ordered. Decision-making details documented in ED Course.    Risk  Prescription drug management.        Final diagnoses:   Strain of lumbar region, initial encounter   Osteoarthritis, unspecified osteoarthritis type, unspecified site       ED Disposition  ED Disposition       ED Disposition   Discharge    Condition   Good    Comment   --               Alexandro Graham, DO  [address] [facility] [address]  [facility] [address] [phone]               Medication List        New Prescriptions      diclofenac 50 MG EC tablet  Commonly known as: VOLTAREN  Take 1 tablet by mouth 3 (Three) Times a Day.               Where to Get Your Medications        These medications were sent to [facility]/pharmacy #[id number] - [place], [place] - [address] AT ACROSS FROM [address]  [phone] [facility] [phone]   [address] [facility] [address]       Phone: [phone]   diclofenac 50 MG EC tablet            Caridad Pearl, APRN  02/29/24 7429

## 2024-03-25 ENCOUNTER — OFFICE VISIT (OUTPATIENT)
Dept: NEUROSURGERY | Facility: CLINIC | Age: 49
End: 2024-03-25
Payer: COMMERCIAL

## 2024-03-25 ENCOUNTER — HOSPITAL ENCOUNTER (OUTPATIENT)
Dept: GENERAL RADIOLOGY | Facility: HOSPITAL | Age: 49
Discharge: HOME | End: 2024-03-25
Admitting: PHYSICIAN ASSISTANT
Payer: COMMERCIAL

## 2024-03-25 VITALS — WEIGHT: 225.4 LBS | BODY MASS INDEX: 32.34 KG/M2

## 2024-03-25 DIAGNOSIS — R29.898 WEAKNESS OF LEFT FOOT: Primary | ICD-10-CM

## 2024-03-25 DIAGNOSIS — M51.36 DDD (DEGENERATIVE DISC DISEASE), LUMBAR: ICD-10-CM

## 2024-03-25 DIAGNOSIS — M46.1 SACROILIITIS: ICD-10-CM

## 2024-03-25 DIAGNOSIS — Z78.9 NONSMOKER: ICD-10-CM

## 2024-03-25 DIAGNOSIS — E66.09 CLASS 1 OBESITY DUE TO EXCESS CALORIES WITHOUT SERIOUS COMORBIDITY WITH BODY MASS INDEX (BMI) OF 32.0 TO 32.9 IN ADULT: ICD-10-CM

## 2024-03-25 DIAGNOSIS — M54.16 LUMBAR RADICULOPATHY: ICD-10-CM

## 2024-03-25 DIAGNOSIS — F40.240 CLAUSTROPHOBIA: ICD-10-CM

## 2024-03-25 PROCEDURE — 72110 X-RAY EXAM L-2 SPINE 4/>VWS: CPT

## 2024-03-25 PROCEDURE — 99204 OFFICE O/P NEW MOD 45 MIN: CPT | Performed by: PHYSICIAN ASSISTANT

## 2024-03-25 RX ORDER — METHOCARBAMOL 500 MG/1
TABLET, FILM COATED ORAL
Qty: 30 TABLET | Refills: 0 | Status: SHIPPED | OUTPATIENT
Start: 2024-03-25

## 2024-03-25 RX ORDER — MELOXICAM 15 MG/1
15 TABLET ORAL DAILY
Qty: 30 TABLET | Refills: 0 | Status: SHIPPED | OUTPATIENT
Start: 2024-03-25

## 2024-03-25 RX ORDER — DIAZEPAM 5 MG/1
TABLET ORAL
Qty: 1 TABLET | Refills: 0 | Status: SHIPPED | OUTPATIENT
Start: 2024-03-25

## 2024-03-25 RX ORDER — DIAZEPAM 5 MG/1
5 TABLET ORAL 2 TIMES DAILY PRN
Status: CANCELLED | OUTPATIENT
Start: 2024-03-25

## 2024-03-25 NOTE — LETTER
March 25, 2024     Patient: Shade Maravilla   YOB: 1975   Date of Visit: 3/25/2024       To Whom It May Concern:    It is my medical opinion that Shade Maravilla may continue off work until recheck appointment.           Sincerely,        Mariah Guo PA-C    CC:   No Recipients

## 2024-03-25 NOTE — PATIENT INSTRUCTIONS
Avoid lifting greater than 10 pounds as well as bending and twisting.  You have been referred for physical therapy.  An MRI of the lumbar spine has been ordered.  Take meloxicam daily with food and try methocarbamol at night.  You will need a  to take you to your MRI appointment since I am premedicating you with Valium.  If your pain improves and you want to go back to work before your follow-up appointment, notify the office.  Call the office should you have any worsening in pain, worsening of weakness or difficulties with ambulation.

## 2024-03-25 NOTE — PROGRESS NOTES
"    Chief complaint:   Chief Complaint   Patient presents with    Back Pain     Strain of lumbar region, initial encounter; Osteoarthritis, unspecified osteoarthritis type, unspecified site - 2/24/24 CT LUMBAR SPINE WO CONTRAST @  [facility]- pt states it started on February 23rd. NO PT or pain management. No interested in surgery or medication. Pt stated sitting down getting back up that makes it worse.       Subjective     HPI: Shade comes in today as an ER follow-up.  He was referred by Dr. Veloz.  He states that on 2/23/2024 he went to work as usual and noticed he had some difficulty walking and his low back \"felt funny\".  By 10:00 that morning, he was laying prone on the table with his legs hanging off the edge for comfort.  Pain level evolved significantly over the next 24 hours and he ended up in the ER.  He had CT of the lumbar spine and was medicated with steroids, narcotic pain relievers and muscle relaxers.  The pain is located in the low back and radiates to bilateral iliac crests and into the left lateral thigh.  He has noticed left leg weakness.  He indicates that his pain is somewhat improved but he continues with the leg weakness.  Pain does increase when he lays down at night.  He reports the toes of the left foot have been numb for the past year.    He has history of L5-S1 microdiscectomy followed by [facility] in [place] 2006.  He did well after the surgery until recently.    He is  and works at the [employer].  He does not smoke.    Review of Systems     Past Medical History:   Diagnosis Date    Claustrophobia 3/25/2024    Lumbosacral disc disease     Please see medical history     Past Surgical History:   Procedure Laterality Date    BACK SURGERY      LUMBAR LAMINECTOMY      L5-S1    LUMBAR LAMINECTOMY ANTERIOR LUMBAR INTERBODY FUSION      L5-S1    POLYPECTOMY      RECTAL FISTULOTOMY      SPINAL FUSION      Medical history     Family History   Problem Relation Age of Onset    Cancer Paternal " Grandfather             Cancer Paternal Aunt             Cancer Paternal Uncle             Cancer Paternal Uncle         Living 84yrs old    Cancer Paternal Uncle              Social History     Tobacco Use    Smoking status: Former     Current packs/day: 0.00     Average packs/day: 0.5 packs/day for 22.0 years (11.0 ttl pk-yrs)     Types: Cigarettes     Start date: 2000     Quit date: 2022     Years since quittin.3     Passive exposure: Never    Smokeless tobacco: Never   Vaping Use    Vaping status: Never Used   Substance Use Topics    Alcohol use: Not Currently    Drug use: Not Currently     Types: Amphetamines, Benzodiazepines, Hydrocodone, Marijuana, Oxycodone     Comment: All narcotics were per  orders     (Not in a hospital admission)    Allergies:  Gabapentin    Objective      Vital Signs  Wt 102 kg (225 lb 6.4 oz)   BMI 32.34 kg/m²     Physical Exam  Constitutional:       Appearance: Normal appearance. He is well-developed.      Comments: Patient prefers to stand.  He indicates that is more comfortable.   HENT:      Head: Normocephalic.   Eyes:      General: Lids are normal.      Conjunctiva/sclera: Conjunctivae normal.      Pupils: Pupils are equal, round, and reactive to light.   Pulmonary:      Effort: Pulmonary effort is normal.      Breath sounds: Normal breath sounds.   Musculoskeletal:         General: Tenderness (left SI joint) present. Normal range of motion.      Cervical back: Normal range of motion.   Skin:     General: Skin is warm.   Neurological:      Mental Status: He is alert and oriented to person, place, and time.      GCS: GCS eye subscore is 4. GCS verbal subscore is 5. GCS motor subscore is 6.      Cranial Nerves: No cranial nerve deficit.      Sensory: Sensory deficit present.      Motor: Weakness present.      Deep Tendon Reflexes: Reflexes are normal and symmetric. Reflexes normal.   Psychiatric:         Speech: Speech normal.          Behavior: Behavior normal.         Thought Content: Thought content normal.         Neurologic Exam     Mental Status   Oriented to person, place, and time.   Speech: speech is normal   Level of consciousness: alert    Cranial Nerves     CN III, IV, VI   Pupils are equal, round, and reactive to light.    Motor Exam   Muscle bulk: normal  Overall muscle tone: normal    Strength   Strength 5/5 except as noted.   Left iliopsoas: 4/5  Left anterior tibial: 4/5Left EHL weakness 4+/5     Sensory Exam   Decreased sensation to light touch dorsal surface left foot     Gait, Coordination, and Reflexes Gait is compensated 2nd to pain       Imaging review: CT of the lumbar spine was reviewed and demonstrate stable anterior fusion changes L5-S1.  There does appear to be good bony fusion column.  There is at least moderate disc degeneration L4-5 as well as L1-L2.  No acute findings.        Assessment/Plan: I reviewed images in detail with Shade.  He has disc degeneration L4-5 with left foot dorsiflexion and EHL weakness.  He also has some sacroiliitis on the left.    Given that he has some motor deficit, I would recommend proceeding with MRI of the lumbar spine to see if there is any significant stenosis we need to deal with surgically.    For pain control, I would like to get him set up with some physical therapy for both lumbar and SI joint treatment modalities for 6 weeks.    I would like to get AP, lateral and flexion-extension views of the lumbar spine today to evaluate his alignment and to see if there is any instability.    I am placing him on meloxicam 15 mg daily for inflammation.  I am giving him prescription for Robaxin 500 mg at bedtime to see if this will help with his nocturnal symptoms.    He is claustrophobic so we will premedicate before the MRI with Valium.  He understands he will need a .    He will follow-up here with me after the MRI is completed for review.    He will continue off work until recheck  here with me.    He will call for sooner appointment should he have any increased pain, worsening of weakness or other issues or concerns.    Patient is a nonsmoker    The patient's Body mass index is 32.34 kg/m².. BMI is above normal parameters. Recommendations include: educational material    Diagnoses and all orders for this visit:    1. Weakness of left foot (Primary)  -     MRI Lumbar Spine Without Contrast; Future  -     Ambulatory Referral to Physical Therapy Evaluate and treat    2. DDD (degenerative disc disease), lumbar  -     XR Spine Lumbar AP & Lateral With Flex & Ext; Future  -     MRI Lumbar Spine Without Contrast; Future  -     Ambulatory Referral to Physical Therapy Evaluate and treat  -     meloxicam (MOBIC) 15 MG tablet; Take 1 tablet by mouth Daily.  Dispense: 30 tablet; Refill: 0  -     methocarbamol (ROBAXIN) 500 MG tablet; Take at night as needed  Dispense: 30 tablet; Refill: 0    3. Lumbar radiculopathy  -     MRI Lumbar Spine Without Contrast; Future    4. Sacroiliitis  -     Ambulatory Referral to Physical Therapy Evaluate and treat    5. Claustrophobia  -     diazePAM (VALIUM) 5 MG tablet; Take 1 tablet 30 minutes before MRI  Dispense: 1 tablet; Refill: 0    6. Nonsmoker    7. Class 1 obesity due to excess calories without serious comorbidity with body mass index (BMI) of 32.0 to 32.9 in adult          I discussed the patients findings and my recommendations with patient    Mariah Guo PA-C  03/25/24  14:31 CDT

## 2024-04-05 ENCOUNTER — TREATMENT (OUTPATIENT)
Dept: PHYSICAL THERAPY | Facility: CLINIC | Age: 49
End: 2024-04-05
Payer: COMMERCIAL

## 2024-04-05 DIAGNOSIS — R29.898 WEAKNESS OF LEFT FOOT: ICD-10-CM

## 2024-04-05 DIAGNOSIS — M54.42 RIGHT-SIDED LOW BACK PAIN WITH LEFT-SIDED SCIATICA, UNSPECIFIED CHRONICITY: Primary | ICD-10-CM

## 2024-04-05 DIAGNOSIS — M46.1 SACROILIITIS: ICD-10-CM

## 2024-04-05 PROCEDURE — 97162 PT EVAL MOD COMPLEX 30 MIN: CPT | Performed by: PHYSICAL THERAPIST

## 2024-04-05 PROCEDURE — 97535 SELF CARE MNGMENT TRAINING: CPT | Performed by: PHYSICAL THERAPIST

## 2024-04-05 NOTE — PROGRESS NOTES
Physical Therapy Initial Evaluation and Plan of Care  [address] [place] [address]    Patient: Shade Maravilla               : 1975  Visit Date: 2024  Referring practitioner: Mariah Guo PA-C  Date of Initial Visit: 2024  Patient seen for 1 sessions    Visit Diagnoses:    ICD-10-CM ICD-9-CM   1. Right-sided low back pain with left-sided sciatica, unspecified chronicity  M54.42 724.3   2. Sacroiliitis  M46.1 720.2   3. Weakness of left foot  R29.898 734     Past Medical History:   Diagnosis Date    Claustrophobia 3/25/2024    Lumbosacral disc disease     Please see medical history     Past Surgical History:   Procedure Laterality Date    BACK SURGERY      LUMBAR LAMINECTOMY      L5-S1    LUMBAR LAMINECTOMY ANTERIOR LUMBAR INTERBODY FUSION      L5-S1    POLYPECTOMY      RECTAL FISTULOTOMY      SPINAL FUSION      Medical history         SUBJECTIVE     Subjective Evaluation    History of Present Illness  Date of onset: 2024  Mechanism of injury: He says he doesn't know what happened. He woke up and went to work. He was walking across the yard and his back started hurting and he started walking funny. By 10 am, he was lying down. He went back to work and went home. The next morning, he couldn't move. He needed help to get out of bed. He's better now but he still struggles putting on his shoes. He can't balance on his left leg to put his pants on. He feels pain into his left groin when he tries to put his socks. He has a h/o fusion at L5/S1 in . He says now, he is more uncomfortable but is weak and tight on his left leg. He still feels occasional bouts where his back will lock up on him lasting a few seconds.       Patient Occupation: [employer],  Pain  Current pain ratin  At worst pain ratin  Relieving factors: rest  Exacerbated by: putting on shoes.  Progression: improved    Social Support  Lives with: spouse  and young children    Diagnostic Tests  X-ray: abnormal    Patient Goals  Patient goals for therapy: decreased pain, increased motion, increased strength and independence with ADLs/IADLs  Patient goal: put on shoes easaier and pants standing up; be able to sleep on his stomach     Xray:   1. Status post anterior and interbody osseous fusion at L5-S1.  2. Multilevel spondylosis and facet arthropathy and nospondylolisthesis    Outcome Measure:   Oswestry: 32%       OBJECTIVE     Objective          Palpation     Additional Palpation Details  Left piriformis tender guarded, left IP/PFnot guarded,     Tenderness     Lumbar Spine  Tenderness in the facet joint (Left 4/5).     Additional Tenderness Details  Left SIJ most tender  PA pressure to left L4/5 reproduce left toe pain    Neurological Testing     Sensation     Lumbar   Left   Diminished: light touch    Comments   Left light touch: left 2nd,3rd toes and plantar met heads    Active Range of Motion     Lumbar   Flexion: Active lumbar flexion: 75% with hesitation and guarding; lumbar stays in lordosis.   Extension: Active lumbar extension: 50%     Strength/Myotome Testing     Left Hip   Planes of Motion   Flexion: 5  Extension: 4  Abduction: 4    Right Hip   Planes of Motion   Flexion: 5  Extension: 5  Abduction: 5    Left Knee   Flexion: 5  Extension: 5    Right Knee   Flexion: 5  Extension: 5    Left Ankle/Foot   Dorsiflexion: 5  Plantar flexion: 4  Great toe extension: 4    Right Ankle/Foot   Dorsiflexion: 5  Plantar flexion: 5  Great toe extension: 5    Tests     Lumbar     Left   Negative passive SLR.     Left Pelvic Girdle/Sacrum   Positive: sacral spring and thigh thrust.   Negative: sacrum compression and gapping.     Additional Tests Details  Positive left JOSE with groin and SIJ pain  Delmer leg length symmetrical in supine      Lumbar Flexibility Comments:   Tight left piriformis and hip adductors    Functional Assessment     Single Leg Stance   Left: 6 (less  stable) seconds  Right: 10 seconds        Therapy Education/Self Care [id number]   Education offered today Anatomy of injury  HEP   [employer] Code    Ongoing HEP   Access Code: [email or web address]  URL: [email or web address]  Date: 04/05/2024  Prepared by: Alcides Mendoza    Exercises  - Cat Cow  - 2 x daily - 7 x weekly - 2 sets - 10 reps  - Supine Piriformis Stretch with Foot on Ground  - 2 x daily - 7 x weekly - 2 sets - 30 hold  - Bent Knee Fallouts with Alternating Legs  - 2 x daily - 7 x weekly - 2 sets - 10 reps  - Sidelying Hip Abduction  - 2 x daily - 7 x weekly - 2 sets - 10 reps  - Bird Dog  - 2 x daily - 7 x weekly - 2 sets - 10 reps  - Quadruped Hip Abduction and External Rotation  - 2 x daily - 7 x weekly - 2 sets - 10 reps  - Bird Dog on Counter  - 2 x daily - 7 x weekly - 2 sets - 10 reps  - Standing Hip Abduction with Bent Knee  - 2 x daily - 7 x weekly - 2 sets - 10 reps  - Standing Hip Abduction  - 2 x daily - 7 x weekly - 2 sets - 10 reps   Timed Minutes 15       Total Timed Treatment:     15   mins  Total Time of Visit:            75   mins    ASSESSMENT/PLAN     GOALS:  Goals                                          Progress Note due by 5/5/24                                                      Recert due by 7/3/24   LTG by: 8 weeks Comments Date Status   Improve left hip abd/ext MMT to 5/5      Reports no right radicular symptoms for a week      Able to walk on toes symmetrically       SLS left LE x 10 secs with stability      Able to ascend/descend steps without rail with heavy pack without pain or loss of balance      Stand to/from kneeling x 10 without UE support      Symmtrical ajith hip rotation passively      Improve Oswestry score to 10% or less      Able to return to work with no limitations      Ind with HEP for flexibility and stability          Assessment & Plan       Assessment  Impairments: abnormal muscle firing, abnormal muscle tone, abnormal or restricted ROM, activity intolerance, impaired  physical strength, lacks appropriate home exercise program and pain with function   Functional limitations: lifting, walking, uncomfortable because of pain, sitting, standing and unable to perform repetitive tasks   Assessment details: He appears to have two different issues. His primary problem appears to have been his left SI joint getting stuck causing all muscles through his left pelvis to spasm. He also appears like he may have some radicular from left L4/5 to his foot. He reports being about 50-60% improved but still is unable to work and his job is quite physical requiring a lot of bending, stooping, kneeling and stairs. He is highly motivated to improve.   This patient would benefit from skilled PT.  Prognosis: good    Plan  Therapy options: will be seen for skilled therapy services  Planned modality interventions: dry needling, low level laser therapy, TENS and traction  Planned therapy interventions: abdominal trunk stabilization, flexibility, functional ROM exercises, home exercise program, joint mobilization, manual therapy, motor coordination training, neuromuscular re-education, postural training, soft tissue mobilization, spinal/joint mobilization, strengthening, stretching and therapeutic activities  Frequency: 3x week  Duration in weeks: 12  Treatment plan discussed with: patient  Plan details: Focus early on pain relief with soft tissue work and modalities as needed. Work on  mobility and flexibility through the spine and hips. Progress with postural/core/hip stability to improve postural alignment and mechanics.Progress the HEP for the same.        SIGNATURE: Alcides Mendoza, PT, [place] License #: [id number]  Electronically Signed on 4/5/2024      Initial Certification  Certification Period: 4/5/2024 through 7/3/2024  I certify that the therapy services are furnished while this patient is under my care.  The services outlined above are required by this patient, and will be reviewed every 90  days.     PHYSICIAN: Mariah Guo PA-C (NPI: [id number])    Signature____________________________________________DATE: _________     Please sign and return via fax to [phone].   Thank you so much for letting us work with Shade. I appreciate your letting us work with your patients. If you have any questions or concerns, please don't hesitate to contact me.          [address] [address]  [phone]

## 2024-04-16 ENCOUNTER — TREATMENT (OUTPATIENT)
Dept: PHYSICAL THERAPY | Facility: CLINIC | Age: 49
End: 2024-04-16
Payer: COMMERCIAL

## 2024-04-16 DIAGNOSIS — R29.898 WEAKNESS OF LEFT FOOT: ICD-10-CM

## 2024-04-16 DIAGNOSIS — M46.1 SACROILIITIS: ICD-10-CM

## 2024-04-16 DIAGNOSIS — M54.42 RIGHT-SIDED LOW BACK PAIN WITH LEFT-SIDED SCIATICA, UNSPECIFIED CHRONICITY: Primary | ICD-10-CM

## 2024-04-16 PROCEDURE — 97140 MANUAL THERAPY 1/> REGIONS: CPT

## 2024-04-16 PROCEDURE — 97530 THERAPEUTIC ACTIVITIES: CPT

## 2024-04-16 NOTE — PROGRESS NOTES
Physical Therapy Treatment Note  [address] [place] [address]    Patient: Shade Maravilla                                                 Visit Date: 2024  :     1975    Referring practitioner:    Mariah Guo PA-C  Date of Initial Visit:          Type: THERAPY  Noted: 2024    Patient seen for 2 sessions    Visit Diagnoses:    ICD-10-CM ICD-9-CM   1. Right-sided low back pain with left-sided sciatica, unspecified chronicity  M54.42 724.3   2. Sacroiliitis  M46.1 720.2   3. Weakness of left foot  R29.898 734     SUBJECTIVE     Subjective: He feels better but still has a shock in his back and hips when he moves a certain way. Lifting is still difficult. Denies pain.      PAIN: 0/10         OBJECTIVE     Objective     Manual Therapy     [id number]  Comments   Efe test position sacral mobs, L    Figure 8 lumbar T/D    Inferolateral hip glides    Sacral PA, L, prone    IASTM Powerboost L1 bal attmt w/ passive L hip IR stretch    Timed Minutes 37     Therapeutic Activities    [id number] Comments   Examined lifting mechanics    Examined body mechanics during ADLs    Timed Minutes 8       Therapy Education/Self Care [id number]   Education offered today    [facility] Code [email or web address]   Ongoing HEP     Date: 2024  Prepared by: Alcides Mendoza    Exercises  - Cat Cow  - 2 x daily - 7 x weekly - 2 sets - 10 reps  - Supine Piriformis Stretch with Foot on Ground  - 2 x daily - 7 x weekly - 2 sets - 30 hold  - Bent Knee Fallouts with Alternating Legs  - 2 x daily - 7 x weekly - 2 sets - 10 reps  - Sidelying Hip Abduction  - 2 x daily - 7 x weekly - 2 sets - 10 reps  - Bird Dog  - 2 x daily - 7 x weekly - 2 sets - 10 reps  - Quadruped Hip Abduction and External Rotation  - 2 x daily - 7 x weekly - 2 sets - 10 reps  - Bird Dog on Counter  - 2 x daily - 7 x weekly - 2 sets - 10 reps  - Standing Hip Abduction with Bent Knee  - 2 x daily - 7 x weekly - 2 sets - 10  reps  - Standing Hip Abduction  - 2 x daily - 7 x weekly - 2 sets - 10 reps   Timed Minutes        Total Timed Treatment:     45   mins  Total Time of Visit:           45    mins    ASSESSMENT/PLAN     GOALS:  Goals                                          Progress Note due by 5/5/24                                                      Recert due by 7/3/24   LTG by: 8 weeks Comments Date Status   Improve left hip abd/ext MMT to 5/5      Reports no right radicular symptoms for a week      Able to walk on toes symmetrically       SLS left LE x 10 secs with stability      Able to ascend/descend steps without rail with heavy pack without pain or loss of balance      Stand to/from kneeling x 10 without UE support      Symmtrical ajith hip rotation passively      Improve Oswestry score to 10% or less      Able to return to work with no limitations      Ind with HEP for flexibility and stability          Assessment/Plan     ASSESSMENT: Initiated session w/ manual techniques for improving L sacral mobility, hip flexibility, and traction/distraction through the spine and hips d/t hypomobility noted on PT IE. Discomfort was produced w/ prone lying, however his hesitancy and discomfort w/ lumbar flexion alludes to some sort of derangement in the lumbar spine. He is getting an MRI tomorrow which may assist in guiding tx approach going forward, however if he does have a disc issue it may be difficult to tx as prone press ups or extremes of extension are not a particularly good option for him d/t his L5/S1 fusion. He demonstrates correct lifting mechanics though does have lumbar discomfort during this task. He demo's functional L hip weakness during SLS needed to perform ADLs though pt admittedly relies on daily physical activity vs HEP for strengthening. Discussed that HEP is a more muscle specific program vs overall conditioning and pt verbalized intent to attempt HEP prior to next session.     PLAN: Focus early on pain relief  with soft tissue work and modalities as needed. Work on mobility and flexibility through the spine and hips. Progress with postural/core/hip stability to improve postural alignment and mechanics. Progress the HEP for the same.     SIGNATURE: Carol Velazquez PT DPT, [place] License #: [id number]  Electronically Signed on 4/16/2024        [place] [address]  [phone]

## 2024-04-17 ENCOUNTER — TELEPHONE (OUTPATIENT)
Dept: NEUROSURGERY | Facility: CLINIC | Age: 49
End: 2024-04-17
Payer: COMMERCIAL

## 2024-04-17 ENCOUNTER — HOSPITAL ENCOUNTER (OUTPATIENT)
Dept: MRI IMAGING | Facility: HOSPITAL | Age: 49
Discharge: HOME | End: 2024-04-17
Payer: COMMERCIAL

## 2024-04-17 ENCOUNTER — LAB (OUTPATIENT)
Dept: LAB | Facility: HOSPITAL | Age: 49
End: 2024-04-17
Payer: COMMERCIAL

## 2024-04-17 DIAGNOSIS — M51.36 DDD (DEGENERATIVE DISC DISEASE), LUMBAR: ICD-10-CM

## 2024-04-17 DIAGNOSIS — M46.46 LUMBAR DISCITIS: ICD-10-CM

## 2024-04-17 DIAGNOSIS — R29.898 WEAKNESS OF LEFT FOOT: ICD-10-CM

## 2024-04-17 DIAGNOSIS — M46.46 LUMBAR DISCITIS: Primary | ICD-10-CM

## 2024-04-17 DIAGNOSIS — M54.16 LUMBAR RADICULOPATHY: ICD-10-CM

## 2024-04-17 LAB
CRP SERPL-MCNC: <0.3 MG/DL (ref 0–0.5)
ERYTHROCYTE [SEDIMENTATION RATE] IN BLOOD: 22 MM/HR (ref 0–15)

## 2024-04-17 PROCEDURE — 86140 C-REACTIVE PROTEIN: CPT

## 2024-04-17 PROCEDURE — 72148 MRI LUMBAR SPINE W/O DYE: CPT

## 2024-04-17 PROCEDURE — 85652 RBC SED RATE AUTOMATED: CPT

## 2024-04-17 PROCEDURE — 36415 COLL VENOUS BLD VENIPUNCTURE: CPT

## 2024-04-18 ENCOUNTER — TREATMENT (OUTPATIENT)
Dept: PHYSICAL THERAPY | Facility: CLINIC | Age: 49
End: 2024-04-18
Payer: COMMERCIAL

## 2024-04-18 ENCOUNTER — TELEPHONE (OUTPATIENT)
Dept: NEUROSURGERY | Facility: CLINIC | Age: 49
End: 2024-04-18
Payer: COMMERCIAL

## 2024-04-18 DIAGNOSIS — M46.1 SACROILIITIS: ICD-10-CM

## 2024-04-18 DIAGNOSIS — R29.898 WEAKNESS OF LEFT FOOT: ICD-10-CM

## 2024-04-18 DIAGNOSIS — M54.42 RIGHT-SIDED LOW BACK PAIN WITH LEFT-SIDED SCIATICA, UNSPECIFIED CHRONICITY: Primary | ICD-10-CM

## 2024-04-18 NOTE — TELEPHONE ENCOUNTER
Attempted to call patient regarding labs. No answer. VM full. Has F/U scheduled for tomorrow with Dr Graham

## 2024-04-18 NOTE — PROGRESS NOTES
Physical Therapy Treatment Note  [address] [place] [address]    Patient: Shade Maravilla                                                 Visit Date: 2024  :     1975    Referring practitioner:    Mariah Guo PA-C  Date of Initial Visit:          Type: THERAPY  Noted: 2024    Patient seen for 3 sessions    Visit Diagnoses:    ICD-10-CM ICD-9-CM   1. Right-sided low back pain with left-sided sciatica, unspecified chronicity  M54.42 724.3   2. Sacroiliitis  M46.1 720.2   3. Weakness of left foot  R29.898 734       SUBJECTIVE     Subjective:  Pt reports that he's doing great. He has read the notes from his MRI yesterday, though does not wasn't to look at the imaging until he can do it with Mariah Guo. When he left here the other day, he took down an above ground pool and went fishing. Denies soreness from last visit. Primary issues are bending over, tying his shoes, and getting out of bed. He can feel himself getting better.       PAIN: 0/10  OBJECTIVE     Objective     Manual Therapy     [id number]  Comments   B hip inferolateral glides with intermittent ER/IR stretching Good response                   Timed Minutes 23     Neuromuscular Reeducation     01209 Comments   Stability bridges with  PB under BLEs and med ball between knees 2x10x5'    Isometric trunk extension into PB 2x10x5'               Timed Minutes 8       Therapy Education/Self Care [id number]   Education offered today Discussed pain science, anatomy, nature of condition, MRI results   [facility] Code [email or web address]   Ongoing HEP     Date: 2024  Prepared by: Alcides Mendoza    Exercises  - Cat Cow  - 2 x daily - 7 x weekly - 2 sets - 10 reps  - Supine Piriformis Stretch with Foot on Ground  - 2 x daily - 7 x weekly - 2 sets - 30 hold  - Bent Knee Fallouts with Alternating Legs  - 2 x daily - 7 x weekly - 2 sets - 10 reps  - Sidelying Hip Abduction  - 2 x daily - 7 x weekly - 2  sets - 10 reps  - Bird Dog  - 2 x daily - 7 x weekly - 2 sets - 10 reps  - Quadruped Hip Abduction and External Rotation  - 2 x daily - 7 x weekly - 2 sets - 10 reps  - Bird Dog on Counter  - 2 x daily - 7 x weekly - 2 sets - 10 reps  - Standing Hip Abduction with Bent Knee  - 2 x daily - 7 x weekly - 2 sets - 10 reps  - Standing Hip Abduction  - 2 x daily - 7 x weekly - 2 sets - 10 reps   Timed Minutes 8       Total Timed Treatment:     39   mins  Total Time of Visit:            39   mins    ASSESSMENT/PLAN     GOALS:  Goals                                          Progress Note due by 5/5/24                                                      Recert due by 7/3/24   LTG by: 8 weeks Comments Date Status   Improve left hip abd/ext MMT to 5/5      Reports no right radicular symptoms for a week      Able to walk on toes symmetrically       SLS left LE x 10 secs with stability      Able to ascend/descend steps without rail with heavy pack without pain or loss of balance      Stand to/from kneeling x 10 without UE support      Symmtrical ajith hip rotation passively      Improve Oswestry score to 10% or less      Able to return to work with no limitations      Ind with HEP for flexibility and stability          Assessment/Plan     ASSESSMENT: Pt responded very favorably to B hip inferolateral mobilizations with intermittent stretching today. MRI results are below, and are consistent with symptomology. He is recovering very well and reports 60-65% improvement overall. Due to noted inflammation, plan to introduce Cold Laser/Estim combo at next visit. Initiated light extensor activation activity today that did not involve excessive lumbar extension, keeping in mind his previous lumbar fusion at L5/S1.    MRI Impression copied from report:  1. Findings at L4-5, highly concerning for discitis osteomyelitis as  described above. Recommend correlation with presentation and serum  inflammatory markers. Aggressive degenerative  changes considered less  likely, but could be considered in the absence of clinical  signs/symptoms of infection.  2. L4-5 disc bulge and LEFT subarticular disc extrusion, which contacts  and likely impinges the descending LEFT L5 nerve root. Correlate with  patient's symptoms.  3. Postoperative changes at L5-S1.    PLAN: Focus early on pain relief with soft tissue work and modalities as needed. Work on mobility and flexibility through the spine and hips. Progress with postural/core/hip stability to improve postural alignment and mechanics. Progress the HEP for the same.     SIGNATURE: Florencia Zhou PT DPT, [place] License #: [id number]    Electronically Signed on 4/18/2024        [address] [place] [address]  [phone]

## 2024-04-19 ENCOUNTER — OFFICE VISIT (OUTPATIENT)
Dept: NEUROSURGERY | Facility: CLINIC | Age: 49
End: 2024-04-19
Payer: COMMERCIAL

## 2024-04-19 VITALS — HEIGHT: 70 IN | WEIGHT: 225 LBS | BODY MASS INDEX: 32.21 KG/M2

## 2024-04-19 DIAGNOSIS — M46.46 LUMBAR DISCITIS: ICD-10-CM

## 2024-04-19 DIAGNOSIS — M54.16 LUMBAR RADICULOPATHY: ICD-10-CM

## 2024-04-19 DIAGNOSIS — E66.09 CLASS 1 OBESITY DUE TO EXCESS CALORIES WITHOUT SERIOUS COMORBIDITY WITH BODY MASS INDEX (BMI) OF 32.0 TO 32.9 IN ADULT: Primary | ICD-10-CM

## 2024-04-19 DIAGNOSIS — Z78.9 NON-SMOKER: ICD-10-CM

## 2024-04-19 DIAGNOSIS — M51.36 DDD (DEGENERATIVE DISC DISEASE), LUMBAR: ICD-10-CM

## 2024-04-19 NOTE — PROGRESS NOTES
"    Chief complaint:   Chief Complaint   Patient presents with    Follow-up     Follow up for possible Lumbar Discitis discuss MRI        Subjective     HPI: I had a chance to see Shade today in follow-up and to review his imaging studies of the lumbar spine.  He does have a significant inflammatory reaction at L4/5 and based on his labs it is difficult to say what exactly caused this.  It certainly could have been an infection however he is now doing very well and has limited back pain and is doing well in physical therapy with a normal CRP and a sed rate of only 22.  It is conceivable that this could be just a very inflamed disc degeneration process and may never know the true answer however he is doing much better and therefore I do not think any intervention is warranted at least at this time.    Review of Systems      Objective      Vital Signs  Ht 177.8 cm (70\")   Wt 102 kg (225 lb)   BMI 32.28 kg/m²     Physical Exam  Neurological:      Mental Status: He is oriented to person, place, and time.      Cranial Nerves: Cranial nerves 2-12 are intact.      Motor: Motor strength is normal.     Gait: Gait is intact.   Psychiatric:         Speech: Speech normal.         Neurologic Exam     Mental Status   Oriented to person, place, and time.   Attention: normal. Concentration: normal.   Speech: speech is normal   Level of consciousness: alert  Knowledge: good.   Normal comprehension.     Cranial Nerves   Cranial nerves II through XII intact.     Motor Exam     Strength   Strength 5/5 throughout.     Sensory Exam   Light touch normal.     Gait, Coordination, and Reflexes     Gait  Gait: normal      Imaging review: EXAM: MRI LUMBAR SPINE WO CONTRAST- - 4/17/2024 9:21 AM     HISTORY: lumbar radiculopathy with left foot weakness; R29.898-Other  symptoms and signs involving the musculoskeletal system; M51.36-Other  intervertebral disc degeneration, lumbar region; M54.16-Radiculopathy,  lumbar region. Office visit note " 3/25/2024 indicates history of L5-S1  microdiscectomy and ALIF in [place] 2006. Pain beginning 2/23/2024,  LEFT leg weakness, increases with laying down.     COMPARISON: 3/25/2024.     TECHNIQUE: Routine MR of the lumbar spine was performed without  intravenous contrast.     FINDINGS:  There appear to be 5 lumbar-type vertebral bodies. There is anterior  fixation hardware and discectomy changes at L5-S1 with fatty marrow  replacement (Modic type II).     Normal alignment. Normal vertebral body height.     Severe bone-on-bone disc height loss with small fluid in the disc space.  There is abnormal edema in the L4 and L5 vertebral bodies with  associated T1 decreased signal. There is loss of the normal cortical  margins at the L4 inferior endplate and L5 superior anterior endplate,  which previously appeared intact on prior CT 2/24/2024. There is  associated prevertebral soft tissue edema extending from at least mid L3  to mid L5.     Small mixed edema and fatty type degenerative endplate changes  anteriorly at T11-12, T12-L1, L1-L2 (Modic type I and II), which can be  a pain generator. Small fat-containing L1 hemangioma.     Mild disc desiccation at L3-4. Moderate disc height loss at the  thoracolumbar junction. Conus terminates at T12-L1, within normal  limits. Normal appearance of the distal thoracic cord and cauda equina  nerve roots.      T11-12. Small disc bulge. No spinal canal or foraminal stenosis.     T12-L1: Small disc bulge. No spinal canal or foraminal stenosis.     L1-2: Small disc bulge. Relative mild spinal canal stenosis. No  foraminal stenosis.      L2-3: No disc bulge, spinal canal or foraminal stenosis.     L3-4: Mild disc bulge. Relative mild spinal canal stenosis. Mild RIGHT  and no LEFT foraminal stenosis. Mild bilateral facet hypertrophy.     L4-5: Small disc bulge. Small superimposed LEFT subarticular disc  extrusion measuring 6 x 13 x 12 mm (AP by transverse by craniocaudal),  which contacts  and displaces the descending LEFT L5 nerve root,  concerning for impingement. Relative mild spinal canal stenosis. No  RIGHT and moderate LEFT foraminal stenosis. Moderate bilateral facet  hypertrophy with small bilateral facet joint fluid.     L5-S1: Discectomy. Spinal canal and foramen appear patent.     Prevertebral soft tissue edema as described above. Otherwise the  overlying soft tissues appear within normal limits.        IMPRESSION:  1. Findings at L4-5, highly concerning for discitis osteomyelitis as  described above. Recommend correlation with presentation and serum  inflammatory markers. Aggressive degenerative changes considered less  likely, but could be considered in the absence of clinical  signs/symptoms of infection.  2. L4-5 disc bulge and LEFT subarticular disc extrusion, which contacts  and likely impinges the descending LEFT L5 nerve root. Correlate with  patient's symptoms.  3. Postoperative changes at L5-S1.           Assessment/Plan:   Severe L4/5 disc degeneration versus discitis.  His back pain has improved significantly and therefore with a normal CRP I do not think we are dealing with an infection at this point.  He is currently doing physical therapy and I would like to continue that and we will see him back in the second week of May to see how he is doing.  He will call us in the meantime if things worsen.    Patient is a nonsmoker  The patient's Body mass index is 32.28 kg/m².. BMI is above normal parameters. Recommendations include: continue with current weight loss program    Diagnoses and all orders for this visit:    1. Class 1 obesity due to excess calories without serious comorbidity with body mass index (BMI) of 32.0 to 32.9 in adult (Primary)    2. Non-smoker    3. Lumbar radiculopathy    4. DDD (degenerative disc disease), lumbar    5. Lumbar discitis        I discussed the patients findings and my recommendations with patient  Alexandro Graham DO  04/19/24  10:50  CDT

## 2024-04-23 ENCOUNTER — TREATMENT (OUTPATIENT)
Dept: PHYSICAL THERAPY | Facility: CLINIC | Age: 49
End: 2024-04-23
Payer: COMMERCIAL

## 2024-04-23 DIAGNOSIS — R29.898 WEAKNESS OF LEFT FOOT: ICD-10-CM

## 2024-04-23 DIAGNOSIS — M54.42 RIGHT-SIDED LOW BACK PAIN WITH LEFT-SIDED SCIATICA, UNSPECIFIED CHRONICITY: Primary | ICD-10-CM

## 2024-04-23 DIAGNOSIS — M46.1 SACROILIITIS: ICD-10-CM

## 2024-04-23 PROCEDURE — 97032 APPL MODALITY 1+ESTIM EA 15: CPT | Performed by: PHYSICAL THERAPIST

## 2024-04-23 PROCEDURE — 97110 THERAPEUTIC EXERCISES: CPT | Performed by: PHYSICAL THERAPIST

## 2024-04-23 PROCEDURE — 97140 MANUAL THERAPY 1/> REGIONS: CPT | Performed by: PHYSICAL THERAPIST

## 2024-04-23 NOTE — PROGRESS NOTES
Physical Therapy Treatment Note  [address] [place] [address]    Patient: Shade Maravilla                                                 Visit Date: 2024  :     1975    Referring practitioner:    Mariah Guo PA-C  Date of Initial Visit:          Type: THERAPY  Noted: 2024    Patient seen for 4 sessions    Visit Diagnoses:    ICD-10-CM ICD-9-CM   1. Right-sided low back pain with left-sided sciatica, unspecified chronicity  M54.42 724.3   2. Sacroiliitis  M46.1 720.2   3. Weakness of left foot  R29.898 734     SUBJECTIVE     Subjective: Pt. reports is he feeling fantastic. He was feeling fine after the last session.       PAIN: 0/10         OBJECTIVE     Objective   Modalities Comments   Cold laser/estim combo to B lumbar region Prone    Tx: 1   Minutes 10          Manual Therapy     [id number]  Comments   STM to B lumbar region Prone                   Timed Minutes 15       Therapeutic Exercises    84440 Units Comments   Bridges with 55 cm SB 2 x 10     Windshield wipers with 45 cm SB x 15     B SL Clamshells with green t band 2 x 10                Timed Minutes 20          Therapy Education/Self Care [id number]   Education offered today     Bob Code    Ongoing HEP   Date: 2024  Prepared by: Alcides Mendoza     Exercises  - Cat Cow  - 2 x daily - 7 x weekly - 2 sets - 10 reps  - Supine Piriformis Stretch with Foot on Ground  - 2 x daily - 7 x weekly - 2 sets - 30 hold  - Bent Knee Fallouts with Alternating Legs  - 2 x daily - 7 x weekly - 2 sets - 10 reps  - Sidelying Hip Abduction  - 2 x daily - 7 x weekly - 2 sets - 10 reps  - Bird Dog  - 2 x daily - 7 x weekly - 2 sets - 10 reps  - Quadruped Hip Abduction and External Rotation  - 2 x daily - 7 x weekly - 2 sets - 10 reps  - Bird Dog on Counter  - 2 x daily - 7 x weekly - 2 sets - 10 reps  - Standing Hip Abduction with Bent Knee  - 2 x daily - 7 x weekly - 2 sets - 10 reps  -  Standing Hip Abduction  - 2 x daily - 7 x weekly - 2 sets - 10 reps   Timed Minutes        Total Timed Treatment:     45   mins  Total Time of Visit:             45   mins         ASSESSMENT/PLAN     GOALS  Goals                                          Progress Note due by 5/5/24                                                      Recert due by 7/3/24   LTG by: 8 weeks Comments Date Status   Improve left hip abd/ext MMT to 5/5  Included hip strengthening exercises today  4/23  ongoing   Reports no right radicular symptoms for a week         Able to walk on toes symmetrically          SLS left LE x 10 secs with stability         Able to ascend/descend steps without rail with heavy pack without pain or loss of balance         Stand to/from kneeling x 10 without UE support         Symmtrical ajith hip rotation passively         Improve Oswestry score to 10% or less         Able to return to work with no limitations         Ind with HEP for flexibility and stability             Assessment/Plan     ASSESSMENT:   Introduced cold laser/estim combo today to decrease inflammation. During STM he presented soft tissue restrictions more on the L lumbar region but they reduced afterwards. Started hip and core strengthening in which he didn't have any increase in pain but got fatigued towards the end. He needed manual cues to keep his trunk still during the clamshells.     PLAN:   Assess his long term response to today's session. Consider checking his hip mobility. Progress with postural/core/hip stability to improve postural alignment and mechanics.     SIGNATURE: Marisol Merlos PTA Student.   Electronically Signed on 4/23/2024  The clinical instructor and/or supervising staff, Tyler Farmer PTA, was present in clinic guiding the visit by approving, concurring, and confirming the skilled judgement for all services rendered.    Signature:  Tyler Farmer PTA, [place] License #: [id number]  Electronically signed on 4/23/2024          [address] [place] [address]  [phone]

## 2024-04-25 ENCOUNTER — TELEPHONE (OUTPATIENT)
Dept: NEUROSURGERY | Facility: CLINIC | Age: 49
End: 2024-04-25

## 2024-04-25 NOTE — TELEPHONE ENCOUNTER
Patient notified forms need to be signed by Dr. Graham then they will go to [place] for processing told him he could call her on Monday when she's back in the office and discuss process

## 2024-04-25 NOTE — TELEPHONE ENCOUNTER
"Caller: Shade Maravilla \"Henrry\"    Relationship: Self    Best call back number: [phone]    What form or medical record are you requesting: SHORT TERM DISABILITY PAPERWORK    Additional notes: PAPERWORK WAS LEFT IN THE OFFICE AT PATIENT'S APPT ON FRIDAY, AND HE STATES [employer]SH[facility] HAS BEEN IN CONTACT ABOUT THIS SINCE. HOWEVER, HE WOULD LIKE TO KNOW WHEN HE SHOULD BE ABLE TO COME BY AND PICK IT UP, SO THAT IT CAN BE TURNED IN PROMPTLY. PLEASE CALL PATIENT TO ADVISE.  "

## 2024-04-26 ENCOUNTER — TREATMENT (OUTPATIENT)
Dept: PHYSICAL THERAPY | Facility: CLINIC | Age: 49
End: 2024-04-26
Payer: COMMERCIAL

## 2024-04-26 DIAGNOSIS — M54.42 RIGHT-SIDED LOW BACK PAIN WITH LEFT-SIDED SCIATICA, UNSPECIFIED CHRONICITY: Primary | ICD-10-CM

## 2024-04-26 DIAGNOSIS — M46.1 SACROILIITIS: ICD-10-CM

## 2024-04-26 DIAGNOSIS — R29.898 WEAKNESS OF LEFT FOOT: ICD-10-CM

## 2024-04-26 PROCEDURE — 97140 MANUAL THERAPY 1/> REGIONS: CPT | Performed by: PHYSICAL THERAPIST

## 2024-04-26 PROCEDURE — 97110 THERAPEUTIC EXERCISES: CPT | Performed by: PHYSICAL THERAPIST

## 2024-04-26 PROCEDURE — 97032 APPL MODALITY 1+ESTIM EA 15: CPT | Performed by: PHYSICAL THERAPIST

## 2024-04-26 NOTE — PROGRESS NOTES
Physical Therapy Treatment Note  [address] [place] [address]    Patient: Shade Maravilla                                                 Visit Date: 2024  :     1975    Referring practitioner:    Mariah Guo PA-C  Date of Initial Visit:          Type: THERAPY  Noted: 2024    Patient seen for 5 sessions    Visit Diagnoses:    ICD-10-CM ICD-9-CM   1. Right-sided low back pain with left-sided sciatica, unspecified chronicity  M54.42 724.3   2. Sacroiliitis  M46.1 720.2   3. Weakness of left foot  R29.898 734     SUBJECTIVE     Subjective:He reports his hip are uncomfortable after sleeping wrong. His back feels great.       PAIN: 0/10         OBJECTIVE     Objective     Modalities Comments   Cold laser/estim combo to B lumbar region Prone     Tx: 2   Minutes 10     Manual Therapy     [id number]  Comments   STM to B lumbar region Prone    B unilateral hip inferior lateral glides Grade 2 followed by ER/IR stretches                        Timed Minutes 25       Therapeutic Exercises    55492 Units Comments   Bridging with 55 cm SB x 20     Pot stirrers @ the wall with 55 cm SB 10 each     B SL Clamshells with green t band x 20 each               Timed Minutes 18      Therapy Education/Self Care [id number]   Education offered today    [employer] Code    Ongoing HEP   [id number]   Date: 2024  Prepared by: Alcides Mendoza     Exercises  - Cat Cow  - 2 x daily - 7 x weekly - 2 sets - 10 reps  - Supine Piriformis Stretch with Foot on Ground  - 2 x daily - 7 x weekly - 2 sets - 30 hold  - Bent Knee Fallouts with Alternating Legs  - 2 x daily - 7 x weekly - 2 sets - 10 reps  - Sidelying Hip Abduction  - 2 x daily - 7 x weekly - 2 sets - 10 reps  - Bird Dog  - 2 x daily - 7 x weekly - 2 sets - 10 reps  - Quadruped Hip Abduction and External Rotation  - 2 x daily - 7 x weekly - 2 sets - 10 reps  - Bird Dog on Counter  - 2 x daily - 7 x weekly - 2 sets -  10 reps  - Standing Hip Abduction with Bent Knee  - 2 x daily - 7 x weekly - 2 sets - 10 reps  - Standing Hip Abduction  - 2 x daily - 7 x weekly - 2 sets - 10 reps   Timed Minutes        Total Timed Treatment:     53   mins  Total Time of Visit:             53   mins         ASSESSMENT/PLAN     GOALS  Goals                                          Progress Note due by 5/5/24                                                      Recert due by 7/3/24   LTG by: 8 weeks Comments Date Status   Improve left hip abd/ext MMT to 5/5         Reports no right radicular symptoms for a week         Able to walk on toes symmetrically          SLS left LE x 10 secs with stability         Able to ascend/descend steps without rail with heavy pack without pain or loss of balance         Stand to/from kneeling x 10 without UE support         Symmtrical ajith hip rotation passively  both still restricted and asymmetrical  4/26  Ongoing   Improve Oswestry score to 10% or less         Able to return to work with no limitations         Ind with HEP for flexibility and stability             Assessment/Plan     ASSESSMENT:   He is having a fairly good and relatively quick response to therapeutic interventions thus far per his reports today. He is positively motivated and eager to participate in therapy so I feel like his progression should continue to move along at a very good pace.     PLAN:   Progress with postural/core/hip stability including increasing resistance to pt. tolerance. Consider assessing his SLS.     SIGNATURE: Tyler Farmer PTA [place] License #: [id number]  Electronically Signed on 4/26/2024        [address] [place] [address] [place] [address]  [phone]

## 2024-05-01 ENCOUNTER — TELEPHONE (OUTPATIENT)
Dept: NEUROSURGERY | Facility: CLINIC | Age: 49
End: 2024-05-01
Payer: COMMERCIAL

## 2024-05-06 ENCOUNTER — OFFICE VISIT (OUTPATIENT)
Dept: NEUROSURGERY | Facility: CLINIC | Age: 49
End: 2024-05-06
Payer: COMMERCIAL

## 2024-05-06 ENCOUNTER — TREATMENT (OUTPATIENT)
Dept: PHYSICAL THERAPY | Facility: CLINIC | Age: 49
End: 2024-05-06
Payer: COMMERCIAL

## 2024-05-06 VITALS — BODY MASS INDEX: 32.13 KG/M2 | WEIGHT: 224.4 LBS | HEIGHT: 70 IN

## 2024-05-06 DIAGNOSIS — R29.898 WEAKNESS OF LEFT FOOT: ICD-10-CM

## 2024-05-06 DIAGNOSIS — M46.1 SACROILIITIS: ICD-10-CM

## 2024-05-06 DIAGNOSIS — M51.36 DDD (DEGENERATIVE DISC DISEASE), LUMBAR: Primary | ICD-10-CM

## 2024-05-06 DIAGNOSIS — Z78.9 NONSMOKER: ICD-10-CM

## 2024-05-06 DIAGNOSIS — M54.42 RIGHT-SIDED LOW BACK PAIN WITH LEFT-SIDED SCIATICA, UNSPECIFIED CHRONICITY: Primary | ICD-10-CM

## 2024-05-06 DIAGNOSIS — E66.09 CLASS 1 OBESITY DUE TO EXCESS CALORIES WITHOUT SERIOUS COMORBIDITY WITH BODY MASS INDEX (BMI) OF 32.0 TO 32.9 IN ADULT: ICD-10-CM

## 2024-05-06 PROCEDURE — 97110 THERAPEUTIC EXERCISES: CPT | Performed by: PHYSICAL THERAPIST

## 2024-05-06 PROCEDURE — 97140 MANUAL THERAPY 1/> REGIONS: CPT | Performed by: PHYSICAL THERAPIST

## 2024-05-06 PROCEDURE — 99214 OFFICE O/P EST MOD 30 MIN: CPT | Performed by: PHYSICIAN ASSISTANT

## 2024-05-09 ENCOUNTER — TREATMENT (OUTPATIENT)
Dept: PHYSICAL THERAPY | Facility: CLINIC | Age: 49
End: 2024-05-09
Payer: COMMERCIAL

## 2024-05-09 DIAGNOSIS — R29.898 WEAKNESS OF LEFT FOOT: ICD-10-CM

## 2024-05-09 DIAGNOSIS — M54.42 RIGHT-SIDED LOW BACK PAIN WITH LEFT-SIDED SCIATICA, UNSPECIFIED CHRONICITY: Primary | ICD-10-CM

## 2024-05-09 DIAGNOSIS — M46.1 SACROILIITIS: ICD-10-CM

## 2024-05-09 PROCEDURE — 97110 THERAPEUTIC EXERCISES: CPT | Performed by: PHYSICAL THERAPIST
